# Patient Record
Sex: MALE | Race: WHITE | NOT HISPANIC OR LATINO | Employment: FULL TIME | URBAN - METROPOLITAN AREA
[De-identification: names, ages, dates, MRNs, and addresses within clinical notes are randomized per-mention and may not be internally consistent; named-entity substitution may affect disease eponyms.]

---

## 2019-08-24 ENCOUNTER — OFFICE VISIT (OUTPATIENT)
Dept: URGENT CARE | Facility: CLINIC | Age: 54
End: 2019-08-24
Payer: COMMERCIAL

## 2019-08-24 VITALS
RESPIRATION RATE: 16 BRPM | HEART RATE: 87 BPM | SYSTOLIC BLOOD PRESSURE: 133 MMHG | TEMPERATURE: 99.2 F | BODY MASS INDEX: 19.29 KG/M2 | WEIGHT: 120 LBS | OXYGEN SATURATION: 99 % | DIASTOLIC BLOOD PRESSURE: 78 MMHG | HEIGHT: 66 IN
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
## 2019-08-24 DIAGNOSIS — S61.451A CAT BITE OF RIGHT HAND, INITIAL ENCOUNTER: Primary | ICD-10-CM

## 2019-08-24 PROCEDURE — 99203 OFFICE O/P NEW LOW 30 MIN: CPT | Performed by: PHYSICIAN ASSISTANT

## 2019-08-24 PROCEDURE — 90715 TDAP VACCINE 7 YRS/> IM: CPT

## 2019-08-24 PROCEDURE — 90471 IMMUNIZATION ADMIN: CPT | Performed by: PHYSICIAN ASSISTANT

## 2019-08-24 RX ORDER — AMOXICILLIN AND CLAVULANATE POTASSIUM 875; 125 MG/1; MG/1
1 TABLET, FILM COATED ORAL EVERY 12 HOURS SCHEDULED
Qty: 20 TABLET | Refills: 0 | Status: SHIPPED | OUTPATIENT
Start: 2019-08-24 | End: 2019-09-03

## 2019-08-24 NOTE — PROGRESS NOTES
s  [facility]        NAME: Venus Juarez is a 62 y o  male  : 1965    MRN: [id number]  DATE: 2019  TIME: 4:35 PM    Assessment and Plan   Cat bite of right hand, initial encounter Carlota Blue  1  Cat bite of right hand, initial encounter  amoxicillin-clavulanate (AUGMENTIN) 875-125 mg per tablet    TDAP Vaccine greater than or equal to 6yo         Patient Instructions     Start Augmentin as prescribed  Follow up with PCP in 3-5 days  Proceed to  ER if symptoms worsen  Chief Complaint     Chief Complaint   Patient presents with    Animal Bite     bilateral hands         History of Present Illness       Patient was trying to separate his cat from fighting with another cat when he was bitten by his own cat  This incident happened earlier today as times going on the hand is becoming more increasingly swollen and painful  Last tetanus is unknown  Review of Systems   Review of Systems   Constitutional: Negative for chills and fever  Eyes: Negative for redness  Gastrointestinal: Negative for nausea and vomiting  Musculoskeletal: Negative for arthralgias  Skin: Positive for wound  Neurological: Negative for weakness and numbness  Hematological: Negative for adenopathy  Current Medications       Current Outpatient Medications:     amoxicillin-clavulanate (AUGMENTIN) 875-125 mg per tablet, Take 1 tablet by mouth every 12 (twelve) hours for 10 days, Disp: 20 tablet, Rfl: 0    Current Allergies     Allergies as of 2019    (No Known Allergies)            The following portions of the patient's history were reviewed and updated as appropriate: allergies, current medications, past family history, past medical history, past social history, past surgical history and problem list      History reviewed  No pertinent past medical history  History reviewed  No pertinent surgical history  History reviewed  No pertinent family history        Medications have been verified  Objective   /78   Pulse 87   Temp 99 2 °F (37 3 °C)   Resp 16   Ht 5' 6" (1 676 m)   Wt 54 4 kg (120 lb)   SpO2 99%   BMI 19 37 kg/m²        Physical Exam     Physical Exam   Constitutional: He is oriented to person, place, and time  He appears well-developed and well-nourished  HENT:   Head: Normocephalic and atraumatic  Neck: Normal range of motion  Cardiovascular: Normal rate and regular rhythm  Pulmonary/Chest: Effort normal    Musculoskeletal:   Dorsum of right hand with several punctures and a linear abrasion  There is mild surrounding erythema no current drainage  No lymphatic streaking  Capillary refill less than 2 seconds for   Neurological: He is alert and oriented to person, place, and time  Skin: Skin is warm and dry  Psychiatric: He has a normal mood and affect  His behavior is normal    Nursing note and vitals reviewed

## 2019-08-24 NOTE — PATIENT INSTRUCTIONS
Animal Bite   AMBULATORY CARE:   Animal bite  injuries range from shallow cuts to deep, life-threatening wounds  Animal bites occur more often on the hands, arms, legs, and face  Bites from dogs and cats are the most common injuries  Common symptoms include the following:   · Cut or punctured skin     · Skin torn from your body    · Swollen or bruised skin, even if the skin is not broken  Seek care immediately if:   · You have a fever  · Your wound is red, swollen, and draining pus  · You see red streaks on the skin around the wound  · You can no longer move the bitten area  · Your heartbeat and breathing are much faster than usual     · You feel dizzy and confused  Contact your healthcare provider if:   · Your pain does not get better, even after you take pain medicine  · You have nightmares or flashbacks about the animal bite  · You have questions or concerns about your condition or care  Treatment for an animal bite  may include any of the following:  · Irrigation and debridement  may be needed to clean out your wound  Dead, damaged, or infected tissue may be cut away to help your wound heal     · Medicines:      ¨ Antibiotics  prevent or treat a bacterial infection  ¨ Prescription pain medicine  may be given  Ask how to take this medicine safely  ¨ A tetanus vaccine  may be needed to prevent tetanus  Tetanus is a life-threatening bacterial infection that affects the nerves and muscles  The bacteria can be spread through animal bites  ¨ A rabies vaccine  may be needed to prevent rabies  Rabies is a life-threatening viral infection  The virus can be spread through animal bites  · Stitches  may be needed if your wound is large and not infected  · Surgery  may be needed to repair deep injuries or severe wounds  Manage your symptoms:   · Apply antibiotic ointment as directed  This helps prevent infection in minor skin wounds   Antibiotic ointment is available without a prescription  · Keep the wound clean and covered  Wash the wound every day with soap and water or germ-killing cleanser  Ask what kinds of bandages to use  · Apply ice to your wound  Ice helps prevent tissue damage and decreases swelling and pain  Use an ice pack, or put crushed ice in a plastic bag  Cover it with a towel  Apply ice on your wound for 15 to 20 minutes every hour or as directed  · Elevate your wound  Raise your wound above the level of your heart as often as you can  This will help decrease swelling and pain  Prop your wound on pillows or blankets to keep it elevated comfortably  Prevent another animal bite:   · Learn to recognize the signs of a scared pet  Avoid quick, sudden movements  · Do not step between animals that are fighting  · Do not leave a pet alone with a young child  · Do not disturb an animal while it eats, sleeps, or cares for its young  · Do not approach an animal you do not know, especially one that is tied up or caged  · Stay away from animals that seem sick or act strangely  · Do not feed or capture wild animals  Follow up with your healthcare provider as directed:  Write down your questions so you remember to ask them during your visits  © 2017 [employer] Information is for End User's use only and may not be sold, redistributed or otherwise used for commercial purposes  All illustrations and images included in CareNotes® are the copyrighted property of [employer]  or [employer]  The above information is an  only  It is not intended as medical advice for individual conditions or treatments  Talk to your doctor, nurse or pharmacist before following any medical regimen to see if it is safe and effective for you

## 2021-02-03 ENCOUNTER — HOSPITAL ENCOUNTER (EMERGENCY)
Facility: HOSPITAL | Age: 56
Discharge: HOME | End: 2021-02-04
Attending: EMERGENCY MEDICINE
Payer: COMMERCIAL

## 2021-02-03 ENCOUNTER — APPOINTMENT (EMERGENCY)
Dept: RADIOLOGY | Facility: HOSPITAL | Age: 56
End: 2021-02-03
Payer: COMMERCIAL

## 2021-02-03 DIAGNOSIS — S82.209A TIBIA/FIBULA FRACTURE: Primary | ICD-10-CM

## 2021-02-03 DIAGNOSIS — S82.409A TIBIA/FIBULA FRACTURE: Primary | ICD-10-CM

## 2021-02-03 PROCEDURE — 29505 APPLICATION LONG LEG SPLINT: CPT | Performed by: EMERGENCY MEDICINE

## 2021-02-03 PROCEDURE — 96374 THER/PROPH/DIAG INJ IV PUSH: CPT

## 2021-02-03 PROCEDURE — 99284 EMERGENCY DEPT VISIT MOD MDM: CPT

## 2021-02-03 PROCEDURE — 99285 EMERGENCY DEPT VISIT HI MDM: CPT | Performed by: EMERGENCY MEDICINE

## 2021-02-03 PROCEDURE — 73600 X-RAY EXAM OF ANKLE: CPT

## 2021-02-03 RX ORDER — FENTANYL CITRATE 50 UG/ML
50 INJECTION, SOLUTION INTRAMUSCULAR; INTRAVENOUS ONCE
Status: COMPLETED | OUTPATIENT
Start: 2021-02-03 | End: 2021-02-03

## 2021-02-03 RX ADMIN — FENTANYL CITRATE 50 MCG: 50 INJECTION INTRAMUSCULAR; INTRAVENOUS at 23:14

## 2021-02-04 VITALS
SYSTOLIC BLOOD PRESSURE: 132 MMHG | RESPIRATION RATE: 16 BRPM | DIASTOLIC BLOOD PRESSURE: 80 MMHG | WEIGHT: 125 LBS | BODY MASS INDEX: 20.18 KG/M2 | OXYGEN SATURATION: 97 % | HEART RATE: 68 BPM | TEMPERATURE: 97.9 F

## 2021-02-04 RX ORDER — HYDROCODONE BITARTRATE AND ACETAMINOPHEN 5; 325 MG/1; MG/1
1 TABLET ORAL EVERY 6 HOURS PRN
Qty: 12 TABLET | Refills: 0 | Status: SHIPPED | OUTPATIENT
Start: 2021-02-04 | End: 2021-02-11 | Stop reason: HOSPADM

## 2021-02-04 NOTE — ED PROVIDER NOTES
History  Chief Complaint   Patient presents with    Fall     on ice  R ankl;e pain with deformity       58yo M    Chief complaint:   Right lower leg pain and swelling and deformity after fall from standing pta    Pt slipped on ice , twisting his leg as he went down  There was no secondary fall, no head injury  He has no other complaints or injuries    No cp or sob  No headache or neck pain    No abdominal pain or back pain         History provided by:  Patient  Fall - Major  Location:  Right lower leg   Severity:  Moderate  Onset quality:  Sudden  Timing:  Constant  Chronicity:  New  Associated symptoms: no abdominal pain, no chest pain, no congestion, no cough, no fatigue, no fever, no headaches, no loss of consciousness, no myalgias, no nausea, no rash, no rhinorrhea, no shortness of breath, no vomiting and no wheezing        None       History reviewed  No pertinent past medical history  History reviewed  No pertinent surgical history  History reviewed  No pertinent family history  I have reviewed and agree with the history as documented  E-Cigarette/Vaping    E-Cigarette Use Never User      E-Cigarette/Vaping Substances     Social History     Tobacco Use    Smoking status: Current Every Day Smoker     Packs/day: 0 25    Smokeless tobacco: Never Used   Substance Use Topics    Alcohol use: Yes     Frequency: 2-4 times a month    Drug use: Never       Review of Systems   Constitutional: Negative for chills, diaphoresis, fatigue and fever  HENT: Negative for congestion and rhinorrhea  Respiratory: Negative for cough, shortness of breath, wheezing and stridor  Cardiovascular: Negative for chest pain, palpitations and leg swelling  Gastrointestinal: Negative for abdominal pain, blood in stool, nausea and vomiting  Genitourinary: Negative for difficulty urinating, dysuria, flank pain and frequency     Musculoskeletal: Negative for arthralgias, back pain, gait problem, joint swelling, myalgias, neck pain and neck stiffness  Acute Right ankle pain s/p inversion injury pta   Skin: Negative for rash and wound  Neurological: Negative for dizziness, loss of consciousness, light-headedness and headaches  All other systems reviewed and are negative  Physical Exam  Physical Exam  Constitutional:       General: He is not in acute distress  Appearance: He is well-developed  He is not ill-appearing, toxic-appearing or diaphoretic  HENT:      Head: Normocephalic and atraumatic  Nose: Nose normal       Mouth/Throat:      Pharynx: No oropharyngeal exudate  Eyes:      General: No scleral icterus  Right eye: No discharge  Left eye: No discharge  Conjunctiva/sclera: Conjunctivae normal       Pupils: Pupils are equal, round, and reactive to light  Neck:      Musculoskeletal: Normal range of motion and neck supple  No neck rigidity or muscular tenderness  Vascular: No JVD  Trachea: No tracheal deviation  Cardiovascular:      Rate and Rhythm: Normal rate and regular rhythm  Heart sounds: Normal heart sounds  No murmur  No friction rub  No gallop  Pulmonary:      Effort: Pulmonary effort is normal  No respiratory distress  Breath sounds: Normal breath sounds  No stridor  No wheezing, rhonchi or rales  Chest:      Chest wall: No tenderness  Abdominal:      General: Bowel sounds are normal  There is no distension  Palpations: Abdomen is soft  There is no mass  Tenderness: There is no abdominal tenderness  There is no right CVA tenderness, left CVA tenderness, guarding or rebound  Hernia: No hernia is present  Musculoskeletal: Normal range of motion  General: Swelling (right lower leg ), tenderness (right lower leg ), deformity (right lower leg ) and signs of injury (right lower leg ) present  Right lower leg: No edema  Left lower leg: No edema  Lymphadenopathy:      Cervical: No cervical adenopathy  Skin:     General: Skin is warm  Capillary Refill: Capillary refill takes less than 2 seconds  Coloration: Skin is not jaundiced or pale  Findings: No bruising, erythema, lesion or rash  Neurological:      General: No focal deficit present  Mental Status: He is alert and oriented to person, place, and time  Mental status is at baseline  Cranial Nerves: No cranial nerve deficit  Sensory: No sensory deficit  Motor: No weakness or abnormal muscle tone  Coordination: Coordination normal    Psychiatric:         Behavior: Behavior normal          Thought Content: Thought content normal          Judgment: Judgment normal       Comments: Agitated affect due to pain          Vital Signs  ED Triage Vitals [02/03/21 2307]   Temperature Pulse Respirations Blood Pressure SpO2   97 9 °F (36 6 °C) 80 18 138/80 97 %      Temp src Heart Rate Source Patient Position - Orthostatic VS BP Location FiO2 (%)   -- -- -- -- --      Pain Score       7           Vitals:    02/03/21 2307 02/03/21 2315 02/04/21 0029   BP: 138/80 138/80 132/80   Pulse: 80 70 68         Visual Acuity      ED Medications  Medications   fentanyl citrate (PF) 100 MCG/2ML 50 mcg (50 mcg Intravenous Given 2/3/21 2314)       Diagnostic Studies  Results Reviewed     None                 XR ankle 2 vw right   ED Interpretation by Dominguez Villar MD (02/03 2343)   Tib Fib Fx       by Austin Jara (02/03 2318)                 Procedures  Procedures         ED Course  ED Course as of Feb 04 0249   Wed Feb 03, 2021   2340 Right leg splinted  Pt tolerated well       Thu Feb 04, 2021   0037 Patient is ambulatory, steady on crutches, independently  He is very grateful for his ER care  He is being assisted home by a friend                                SBIRT 20yo+      Most Recent Value   SBIRT (22 yo +)   In order to provide better care to our patients, we are screening all of our patients for alcohol and drug use   Would it be okay to ask you these screening questions? Yes Filed at: 02/03/2021 2327   Initial Alcohol Screen: US AUDIT-C    1  How often do you have a drink containing alcohol? 3 Filed at: 02/03/2021 2327   2  How many drinks containing alcohol do you have on a typical day you are drinking? 1 Filed at: 02/03/2021 2327   3a  Male UNDER 65: How often do you have five or more drinks on one occasion? 0 Filed at: 02/03/2021 2327   3b  FEMALE Any Age, or MALE 65+: How often do you have 4 or more drinks on one occassion? 0 Filed at: 02/03/2021 2327   Audit-C Score  4 Filed at: 02/03/2021 2327   JORDIN: How many times in the past year have you    Used an illegal drug or used a prescription medication for non-medical reasons? Never Filed at: 02/03/2021 2327                    MDM    Disposition  Final diagnoses:   Tibia/fibula fracture     Time reflects when diagnosis was documented in both MDM as applicable and the Disposition within this note     Time User Action Codes Description Comment    2/3/2021 11:37 PM Farhat Bergeron Add [H76 709M,  S82 409A] Tibia/fibula fracture       ED Disposition     ED Disposition Condition Date/Time Comment    Discharge Stable Wed Feb 3, 2021 11:36 PM Uma Shepard discharge to home/self care  Follow-up Information     Follow up With Specialties Details Why Contact Info    Gómez Paris, DO Orthopedic Surgery Call today  [address] [address]  [phone]            There are no discharge medications for this patient          PDMP Review     None          ED Provider  Electronically Signed by           Burdette Hashimoto, MD  02/04/21 6735

## 2021-02-04 NOTE — DISCHARGE INSTRUCTIONS
RETURN IF WORSE IN ANY WAY, OR NEW AND CONCERNING SYMPTOMS SIGNS OR SYMPTOMS:  INCREASED PAIN, INCREASED SWELLING AT THE SITE OF INJURY    APPLY ICE AS NEEDED  YOU CAN GIVE TYLENOL AND MOTRIN, AS NEEDED, AND Kvng Bazan, Orthopaedic Specialist DOCTOR IN THE MORNING TO SET UP FOLLOW UP

## 2021-02-04 NOTE — ED PROCEDURE NOTE
PROCEDURE  Splint application    Date/Time: 2/3/2021 11:40 PM  Performed by: Marie Ross MD  Authorized by: Marie Ross MD   Universal Protocol:  Risks and benefits: risks, benefits and alternatives were discussed  Consent given by: patient  Time out: Immediately prior to procedure a "time out" was called to verify the correct patient, procedure, equipment, support staff and site/side marked as required  Patient understanding: patient states understanding of the procedure being performed  Radiology Images displayed and confirmed   If images not available, report reviewed: imaging studies available  Patient identity confirmed: verbally with patient      Pre-procedure details:     Sensation:  Normal  Procedure details:     Laterality:  Right    Location:  Leg    Leg:  R lower leg    Splint type:  Long leg    Supplies:  Cotton padding, fiberglass and Ortho-Glass         Marie Ross MD  02/03/21 6191

## 2021-02-08 VITALS
DIASTOLIC BLOOD PRESSURE: 79 MMHG | HEIGHT: 66 IN | HEART RATE: 109 BPM | BODY MASS INDEX: 20.09 KG/M2 | SYSTOLIC BLOOD PRESSURE: 146 MMHG | WEIGHT: 125 LBS

## 2021-02-08 DIAGNOSIS — Z11.59 SPECIAL SCREENING EXAMINATION FOR UNSPECIFIED VIRAL DISEASE: Primary | ICD-10-CM

## 2021-02-08 DIAGNOSIS — S82.409A TIBIA/FIBULA FRACTURE: ICD-10-CM

## 2021-02-08 DIAGNOSIS — S82.209A TIBIA/FIBULA FRACTURE: ICD-10-CM

## 2021-02-08 PROCEDURE — 99203 OFFICE O/P NEW LOW 30 MIN: CPT | Performed by: ORTHOPAEDIC SURGERY

## 2021-02-08 RX ORDER — CHLORHEXIDINE GLUCONATE 4 G/100ML
SOLUTION TOPICAL DAILY PRN
Status: CANCELLED | OUTPATIENT
Start: 2021-02-10

## 2021-02-08 RX ORDER — CHLORHEXIDINE GLUCONATE 0.12 MG/ML
15 RINSE ORAL ONCE
Status: CANCELLED | OUTPATIENT
Start: 2021-02-10 | End: 2021-02-08

## 2021-02-08 RX ORDER — CEFAZOLIN SODIUM 2 G/50ML
2000 SOLUTION INTRAVENOUS ONCE
Status: CANCELLED | OUTPATIENT
Start: 2021-02-10 | End: 2021-02-08

## 2021-02-08 NOTE — PROGRESS NOTES
Assessment/Plan:    No problem-specific Assessment & Plan notes found for this encounter  Diagnoses and all orders for this visit:    Tibia/fibula fracture  -     Ambulatory referral to Orthopedic Surgery  -     XR tibia fibula 2 vw right; Future            The patient has opted for an open reduction and internal fixation of his right lower leg injury  Will probably need intramedullary rodding of the tibia and open reduction and internal fixation of the fibula  All risks, complications, benefits were discussed with the patient in great detail including bleeding, infection, blood clots, pain, stiffness, neurovascular damage, fractures, dislocations, the possibility of loss of life or limb from surgery, heart attack, stroke, etcetera     his surgery is tentatively scheduled for February 10, 2021    Subjective:      Patient ID: Jeremy Edouard is a 60 y o  male  HPI      the patient injured his right lower leg while walking out of work 5 days ago  He slipped and fell in the parking lot at work  He denies hitting his head or any loss of conscious  He had immediate pain and deformity along his right ankle  He did go to the emergency room where x-rays were taken he was given a splint  He still has pain  He denies any numbness and tingling     The following portions of the patient's history were reviewed and updated as appropriate: allergies, current medications, past family history, past medical history, past social history, past surgical history and problem list     Review of Systems   Constitutional: Negative for chills, fever and unexpected weight change  HENT: Negative for hearing loss, nosebleeds and sore throat  Eyes: Negative for pain, redness and visual disturbance  Respiratory: Negative for cough, shortness of breath and wheezing  Cardiovascular: Negative for chest pain, palpitations and leg swelling  Gastrointestinal: Negative for abdominal pain, nausea and vomiting     Endocrine: Negative for polydipsia and polyuria  Genitourinary: Negative for dysuria and hematuria  Musculoskeletal: Positive for arthralgias, gait problem and joint swelling  Negative for back pain, myalgias, neck pain and neck stiffness  As noted in HPI   Skin: Negative for rash and wound  Neurological: Negative for dizziness, numbness and headaches  Psychiatric/Behavioral: Negative for decreased concentration and suicidal ideas  The patient is not nervous/anxious  Objective:      /79 (BP Location: Left arm, Patient Position: Sitting, Cuff Size: Standard)   Pulse (!) 109   Ht 5' 6" (1 676 m)   Wt 56 7 kg (125 lb)   BMI 20 18 kg/m²          Physical Exam       right lower extremity is neurovascular intact  Toes are pink and mobile  Compartments are soft  Range of motion of his knee is from 0-125 degrees  There is no ligament dysfunction  There is tenderness along the right distal tibia/ fibula  There is fracture diastasis present  There is decreased range of motion secondary to pain  X-ray show a displaced right tibial shaft fracture with a distal fibular fracture which is displaced as well

## 2021-02-08 NOTE — H&P (VIEW-ONLY)
Assessment/Plan:    No problem-specific Assessment & Plan notes found for this encounter  Diagnoses and all orders for this visit:    Tibia/fibula fracture  -     Ambulatory referral to Orthopedic Surgery  -     XR tibia fibula 2 vw right; Future            The patient has opted for an open reduction and internal fixation of his right lower leg injury  Will probably need intramedullary rodding of the tibia and open reduction and internal fixation of the fibula  All risks, complications, benefits were discussed with the patient in great detail including bleeding, infection, blood clots, pain, stiffness, neurovascular damage, fractures, dislocations, the possibility of loss of life or limb from surgery, heart attack, stroke, etcetera     his surgery is tentatively scheduled for February 10, 2021    Subjective:      Patient ID: Petros Appiah is a 60 y o  male  HPI      the patient injured his right lower leg while walking out of work 5 days ago  He slipped and fell in the parking lot at work  He denies hitting his head or any loss of conscious  He had immediate pain and deformity along his right ankle  He did go to the emergency room where x-rays were taken he was given a splint  He still has pain  He denies any numbness and tingling     The following portions of the patient's history were reviewed and updated as appropriate: allergies, current medications, past family history, past medical history, past social history, past surgical history and problem list     Review of Systems   Constitutional: Negative for chills, fever and unexpected weight change  HENT: Negative for hearing loss, nosebleeds and sore throat  Eyes: Negative for pain, redness and visual disturbance  Respiratory: Negative for cough, shortness of breath and wheezing  Cardiovascular: Negative for chest pain, palpitations and leg swelling  Gastrointestinal: Negative for abdominal pain, nausea and vomiting     Endocrine: Negative for polydipsia and polyuria  Genitourinary: Negative for dysuria and hematuria  Musculoskeletal: Positive for arthralgias, gait problem and joint swelling  Negative for back pain, myalgias, neck pain and neck stiffness  As noted in HPI   Skin: Negative for rash and wound  Neurological: Negative for dizziness, numbness and headaches  Psychiatric/Behavioral: Negative for decreased concentration and suicidal ideas  The patient is not nervous/anxious  Objective:      /79 (BP Location: Left arm, Patient Position: Sitting, Cuff Size: Standard)   Pulse (!) 109   Ht 5' 6" (1 676 m)   Wt 56 7 kg (125 lb)   BMI 20 18 kg/m²          Physical Exam       right lower extremity is neurovascular intact  Toes are pink and mobile  Compartments are soft  Range of motion of his knee is from 0-125 degrees  There is no ligament dysfunction  There is tenderness along the right distal tibia/ fibula  There is fracture diastasis present  There is decreased range of motion secondary to pain  X-ray show a displaced right tibial shaft fracture with a distal fibular fracture which is displaced as well

## 2021-02-09 ENCOUNTER — ANESTHESIA EVENT (OUTPATIENT)
Dept: PERIOP | Facility: HOSPITAL | Age: 56
End: 2021-02-09
Payer: COMMERCIAL

## 2021-02-09 NOTE — PRE-PROCEDURE INSTRUCTIONS
Pre-Surgery Instructions:   Medication Instructions    HYDROcodone-acetaminophen (NORCO) 5-325 mg per tablet Instructed patient per Anesthesia Guidelines  Education Index    Med Instructions Troubleshoot   Acetaminophen Med Class    Continue to take this medication on your normal schedule  If this is an oral medication and you take it in the morning, then you may take this medicine with a sip of water  Opioid Med Class    Continue to take this medication on your normal schedule  If this is an oral medication and you take it in the morning, then you may take this medicine with a sip of water

## 2021-02-10 ENCOUNTER — HOSPITAL ENCOUNTER (OUTPATIENT)
Facility: HOSPITAL | Age: 56
Setting detail: OUTPATIENT SURGERY
Discharge: HOME | End: 2021-02-11
Attending: ORTHOPAEDIC SURGERY | Admitting: ORTHOPAEDIC SURGERY
Payer: COMMERCIAL

## 2021-02-10 ENCOUNTER — APPOINTMENT (OUTPATIENT)
Dept: RADIOLOGY | Facility: HOSPITAL | Age: 56
End: 2021-02-10
Payer: COMMERCIAL

## 2021-02-10 ENCOUNTER — ANESTHESIA (OUTPATIENT)
Dept: PERIOP | Facility: HOSPITAL | Age: 56
End: 2021-02-10
Payer: COMMERCIAL

## 2021-02-10 VITALS — HEART RATE: 96 BPM

## 2021-02-10 DIAGNOSIS — S82.401S FRACTURE TIBIA/FIBULA, RIGHT, SEQUELA: Primary | ICD-10-CM

## 2021-02-10 DIAGNOSIS — S82.201S FRACTURE TIBIA/FIBULA, RIGHT, SEQUELA: Primary | ICD-10-CM

## 2021-02-10 PROBLEM — S82.202S FRACTURE TIBIA/FIBULA, LEFT, SEQUELA: Status: ACTIVE | Noted: 2021-02-10

## 2021-02-10 PROBLEM — S82.402S FRACTURE TIBIA/FIBULA, LEFT, SEQUELA: Status: ACTIVE | Noted: 2021-02-10

## 2021-02-10 LAB
FLUAV RNA RESP QL NAA+PROBE: NEGATIVE
FLUBV RNA RESP QL NAA+PROBE: NEGATIVE
RSV RNA RESP QL NAA+PROBE: NEGATIVE
SARS-COV-2 RNA RESP QL NAA+PROBE: NEGATIVE

## 2021-02-10 PROCEDURE — C1713 ANCHOR/SCREW BN/BN,TIS/BN: HCPCS | Performed by: ORTHOPAEDIC SURGERY

## 2021-02-10 PROCEDURE — 0241U HB NFCT DS VIR RESP RNA 4 TRGT: CPT | Performed by: STUDENT IN AN ORGANIZED HEALTH CARE EDUCATION/TRAINING PROGRAM

## 2021-02-10 PROCEDURE — 73590 X-RAY EXAM OF LOWER LEG: CPT | Performed by: ORTHOPAEDIC SURGERY

## 2021-02-10 PROCEDURE — 27759 TREATMENT OF TIBIA FRACTURE: CPT | Performed by: ORTHOPAEDIC SURGERY

## 2021-02-10 PROCEDURE — 27759 TREATMENT OF TIBIA FRACTURE: CPT | Performed by: PHYSICIAN ASSISTANT

## 2021-02-10 PROCEDURE — 73590 X-RAY EXAM OF LOWER LEG: CPT

## 2021-02-10 DEVICE — TI END CAP FOR TIBIAL NAILS-EX T40 STRDRV GRAY/5MM EXT-STER
Type: IMPLANTABLE DEVICE | Site: TIBIA | Status: FUNCTIONAL
Brand: EXPERT NAIL

## 2021-02-10 DEVICE — 9MM TI CANN TIBIAL NAIL-EX W/PROX BEND 330MM-STERILE
Type: IMPLANTABLE DEVICE | Site: TIBIA | Status: FUNCTIONAL
Brand: EXPERT NAIL

## 2021-02-10 DEVICE — 4.0MM TI LOCKING SCREW W/T25 STARDRIVE 32MM F/IM NAILS-STER: Type: IMPLANTABLE DEVICE | Site: TIBIA | Status: FUNCTIONAL

## 2021-02-10 DEVICE — 4.0MM TI LOCKING SCREW W/T25 STARDRIVE 40MM F/IM NAILS-STER: Type: IMPLANTABLE DEVICE | Site: TIBIA | Status: FUNCTIONAL

## 2021-02-10 RX ORDER — BUPIVACAINE HYDROCHLORIDE AND EPINEPHRINE 5; 5 MG/ML; UG/ML
INJECTION, SOLUTION EPIDURAL; INTRACAUDAL; PERINEURAL AS NEEDED
Status: DISCONTINUED | OUTPATIENT
Start: 2021-02-10 | End: 2021-02-10 | Stop reason: HOSPADM

## 2021-02-10 RX ORDER — CEFAZOLIN SODIUM 2 G/50ML
2000 SOLUTION INTRAVENOUS ONCE
Status: DISCONTINUED | OUTPATIENT
Start: 2021-02-10 | End: 2021-02-10 | Stop reason: HOSPADM

## 2021-02-10 RX ORDER — EPHEDRINE SULFATE 50 MG/ML
INJECTION INTRAVENOUS AS NEEDED
Status: DISCONTINUED | OUTPATIENT
Start: 2021-02-10 | End: 2021-02-10

## 2021-02-10 RX ORDER — MAGNESIUM HYDROXIDE/ALUMINUM HYDROXICE/SIMETHICONE 120; 1200; 1200 MG/30ML; MG/30ML; MG/30ML
30 SUSPENSION ORAL EVERY 6 HOURS PRN
Status: DISCONTINUED | OUTPATIENT
Start: 2021-02-10 | End: 2021-02-11 | Stop reason: HOSPADM

## 2021-02-10 RX ORDER — BUPIVACAINE HYDROCHLORIDE 5 MG/ML
INJECTION, SOLUTION PERINEURAL AS NEEDED
Status: DISCONTINUED | OUTPATIENT
Start: 2021-02-10 | End: 2021-02-10 | Stop reason: HOSPADM

## 2021-02-10 RX ORDER — ONDANSETRON 2 MG/ML
4 INJECTION INTRAMUSCULAR; INTRAVENOUS ONCE AS NEEDED
Status: DISCONTINUED | OUTPATIENT
Start: 2021-02-10 | End: 2021-02-10 | Stop reason: HOSPADM

## 2021-02-10 RX ORDER — SODIUM CHLORIDE, SODIUM LACTATE, POTASSIUM CHLORIDE, CALCIUM CHLORIDE 600; 310; 30; 20 MG/100ML; MG/100ML; MG/100ML; MG/100ML
125 INJECTION, SOLUTION INTRAVENOUS CONTINUOUS
Status: DISCONTINUED | OUTPATIENT
Start: 2021-02-10 | End: 2021-02-10

## 2021-02-10 RX ORDER — OXYCODONE HYDROCHLORIDE AND ACETAMINOPHEN 5; 325 MG/1; MG/1
1 TABLET ORAL EVERY 4 HOURS PRN
Status: DISCONTINUED | OUTPATIENT
Start: 2021-02-10 | End: 2021-02-11 | Stop reason: HOSPADM

## 2021-02-10 RX ORDER — MORPHINE SULFATE 4 MG/ML
3 INJECTION, SOLUTION INTRAMUSCULAR; INTRAVENOUS EVERY 4 HOURS PRN
Status: DISCONTINUED | OUTPATIENT
Start: 2021-02-10 | End: 2021-02-11 | Stop reason: HOSPADM

## 2021-02-10 RX ORDER — POVIDONE-IODINE 10 MG/G
OINTMENT TOPICAL AS NEEDED
Status: DISCONTINUED | OUTPATIENT
Start: 2021-02-10 | End: 2021-02-10 | Stop reason: HOSPADM

## 2021-02-10 RX ORDER — CEFAZOLIN SODIUM 1 G/50ML
1000 SOLUTION INTRAVENOUS EVERY 8 HOURS
Status: DISCONTINUED | OUTPATIENT
Start: 2021-02-11 | End: 2021-02-11 | Stop reason: HOSPADM

## 2021-02-10 RX ORDER — FENTANYL CITRATE 50 UG/ML
INJECTION, SOLUTION INTRAMUSCULAR; INTRAVENOUS AS NEEDED
Status: DISCONTINUED | OUTPATIENT
Start: 2021-02-10 | End: 2021-02-10

## 2021-02-10 RX ORDER — ONDANSETRON 2 MG/ML
INJECTION INTRAMUSCULAR; INTRAVENOUS AS NEEDED
Status: DISCONTINUED | OUTPATIENT
Start: 2021-02-10 | End: 2021-02-10

## 2021-02-10 RX ORDER — CEFAZOLIN SODIUM 2 G/50ML
SOLUTION INTRAVENOUS AS NEEDED
Status: DISCONTINUED | OUTPATIENT
Start: 2021-02-10 | End: 2021-02-10

## 2021-02-10 RX ORDER — HYDROMORPHONE HCL/PF 1 MG/ML
SYRINGE (ML) INJECTION AS NEEDED
Status: DISCONTINUED | OUTPATIENT
Start: 2021-02-10 | End: 2021-02-10

## 2021-02-10 RX ORDER — GLYCOPYRROLATE 0.2 MG/ML
INJECTION INTRAMUSCULAR; INTRAVENOUS AS NEEDED
Status: DISCONTINUED | OUTPATIENT
Start: 2021-02-10 | End: 2021-02-10

## 2021-02-10 RX ORDER — DOCUSATE SODIUM 100 MG/1
100 CAPSULE, LIQUID FILLED ORAL 2 TIMES DAILY
Status: DISCONTINUED | OUTPATIENT
Start: 2021-02-10 | End: 2021-02-11 | Stop reason: HOSPADM

## 2021-02-10 RX ORDER — MIDAZOLAM HYDROCHLORIDE 2 MG/2ML
INJECTION, SOLUTION INTRAMUSCULAR; INTRAVENOUS AS NEEDED
Status: DISCONTINUED | OUTPATIENT
Start: 2021-02-10 | End: 2021-02-10

## 2021-02-10 RX ORDER — CHLORHEXIDINE GLUCONATE 4 G/100ML
SOLUTION TOPICAL DAILY PRN
Status: DISCONTINUED | OUTPATIENT
Start: 2021-02-10 | End: 2021-02-10 | Stop reason: HOSPADM

## 2021-02-10 RX ORDER — ROCURONIUM BROMIDE 10 MG/ML
INJECTION, SOLUTION INTRAVENOUS AS NEEDED
Status: DISCONTINUED | OUTPATIENT
Start: 2021-02-10 | End: 2021-02-10

## 2021-02-10 RX ORDER — PROPOFOL 10 MG/ML
INJECTION, EMULSION INTRAVENOUS AS NEEDED
Status: DISCONTINUED | OUTPATIENT
Start: 2021-02-10 | End: 2021-02-10

## 2021-02-10 RX ORDER — CHLORHEXIDINE GLUCONATE 0.12 MG/ML
15 RINSE ORAL ONCE
Status: COMPLETED | OUTPATIENT
Start: 2021-02-10 | End: 2021-02-10

## 2021-02-10 RX ORDER — FONDAPARINUX SODIUM 2.5 MG/.5ML
2.5 INJECTION SUBCUTANEOUS DAILY
Status: DISCONTINUED | OUTPATIENT
Start: 2021-02-11 | End: 2021-02-11 | Stop reason: HOSPADM

## 2021-02-10 RX ORDER — NEOSTIGMINE METHYLSULFATE 1 MG/ML
INJECTION INTRAVENOUS AS NEEDED
Status: DISCONTINUED | OUTPATIENT
Start: 2021-02-10 | End: 2021-02-10

## 2021-02-10 RX ORDER — FENTANYL CITRATE/PF 50 MCG/ML
25 SYRINGE (ML) INJECTION
Status: DISCONTINUED | OUTPATIENT
Start: 2021-02-10 | End: 2021-02-10 | Stop reason: HOSPADM

## 2021-02-10 RX ORDER — DEXAMETHASONE SODIUM PHOSPHATE 10 MG/ML
INJECTION, SOLUTION INTRAMUSCULAR; INTRAVENOUS AS NEEDED
Status: DISCONTINUED | OUTPATIENT
Start: 2021-02-10 | End: 2021-02-10

## 2021-02-10 RX ORDER — ACETAMINOPHEN 325 MG/1
650 TABLET ORAL EVERY 4 HOURS PRN
Status: DISCONTINUED | OUTPATIENT
Start: 2021-02-10 | End: 2021-02-11 | Stop reason: HOSPADM

## 2021-02-10 RX ORDER — KETAMINE HCL IN NACL, ISO-OSM 100MG/10ML
SYRINGE (ML) INJECTION AS NEEDED
Status: DISCONTINUED | OUTPATIENT
Start: 2021-02-10 | End: 2021-02-10

## 2021-02-10 RX ORDER — LIDOCAINE HYDROCHLORIDE 10 MG/ML
INJECTION, SOLUTION EPIDURAL; INFILTRATION; INTRACAUDAL; PERINEURAL AS NEEDED
Status: DISCONTINUED | OUTPATIENT
Start: 2021-02-10 | End: 2021-02-10

## 2021-02-10 RX ADMIN — CHLORHEXIDINE GLUCONATE 0.12% ORAL RINSE 15 ML: 1.2 LIQUID ORAL at 13:41

## 2021-02-10 RX ADMIN — CEFAZOLIN SODIUM 2000 MG: 2 SOLUTION INTRAVENOUS at 16:30

## 2021-02-10 RX ADMIN — CEFAZOLIN SODIUM 1000 MG: 2 SOLUTION INTRAVENOUS at 17:36

## 2021-02-10 RX ADMIN — FENTANYL CITRATE 100 MCG: 50 INJECTION INTRAMUSCULAR; INTRAVENOUS at 17:29

## 2021-02-10 RX ADMIN — HYDROMORPHONE HYDROCHLORIDE 0.5 MG: 1 INJECTION, SOLUTION INTRAMUSCULAR; INTRAVENOUS; SUBCUTANEOUS at 18:02

## 2021-02-10 RX ADMIN — SODIUM CHLORIDE, SODIUM LACTATE, POTASSIUM CHLORIDE, AND CALCIUM CHLORIDE: .6; .31; .03; .02 INJECTION, SOLUTION INTRAVENOUS at 15:50

## 2021-02-10 RX ADMIN — HYDROMORPHONE HYDROCHLORIDE 1 MG: 1 INJECTION, SOLUTION INTRAMUSCULAR; INTRAVENOUS; SUBCUTANEOUS at 19:07

## 2021-02-10 RX ADMIN — DEXAMETHASONE SODIUM PHOSPHATE 4 MG: 10 INJECTION, SOLUTION INTRAMUSCULAR; INTRAVENOUS at 16:51

## 2021-02-10 RX ADMIN — GLYCOPYRROLATE 0.4 MG: 0.2 INJECTION, SOLUTION INTRAMUSCULAR; INTRAVENOUS at 18:52

## 2021-02-10 RX ADMIN — NEOSTIGMINE METHYLSULFATE 4 MG: 1 INJECTION, SOLUTION INTRAVENOUS at 18:52

## 2021-02-10 RX ADMIN — ROCURONIUM BROMIDE 50 MG: 10 INJECTION INTRAVENOUS at 16:41

## 2021-02-10 RX ADMIN — ONDANSETRON 4 MG: 2 INJECTION INTRAMUSCULAR; INTRAVENOUS at 16:50

## 2021-02-10 RX ADMIN — LIDOCAINE HYDROCHLORIDE 50 MG: 10 INJECTION, SOLUTION EPIDURAL; INFILTRATION; INTRACAUDAL; PERINEURAL at 16:38

## 2021-02-10 RX ADMIN — EPHEDRINE SULFATE 10 MG: 50 INJECTION, SOLUTION INTRAVENOUS at 17:19

## 2021-02-10 RX ADMIN — PROPOFOL 200 MG: 10 INJECTION, EMULSION INTRAVENOUS at 16:40

## 2021-02-10 RX ADMIN — FENTANYL CITRATE 100 MCG: 50 INJECTION INTRAMUSCULAR; INTRAVENOUS at 16:36

## 2021-02-10 RX ADMIN — HYDROMORPHONE HYDROCHLORIDE 0.5 MG: 1 INJECTION, SOLUTION INTRAMUSCULAR; INTRAVENOUS; SUBCUTANEOUS at 17:57

## 2021-02-10 RX ADMIN — DOCUSATE SODIUM 100 MG: 100 CAPSULE, LIQUID FILLED ORAL at 21:05

## 2021-02-10 RX ADMIN — MIDAZOLAM HYDROCHLORIDE 2 MG: 1 INJECTION, SOLUTION INTRAMUSCULAR; INTRAVENOUS at 16:35

## 2021-02-10 RX ADMIN — Medication 50 MG: at 16:52

## 2021-02-10 NOTE — OP NOTE
OPERATIVE REPORT  PATIENT NAME: Darian Mendoza    :  1965  MRN: [id number]  Pt Location: [address] [place] [address] OR ROOM 02    SURGERY DATE: 2/10/2021    Surgeon(s) and Role:     * Frances Simons, DO - Primary     * Ewelina Stajennifer, CYDNEY - Assisting       Vangie Petit PA-C    Preop Diagnosis:  Tibia/fibula fracture [S82 209A, S82 409A]    Post-Op Diagnosis Codes:     * Tibia/fibula fracture [S82 209A, S82 409A]    Procedure(s) (LRB):  INSERTION NAIL IM TIBIA  WITH ORIF OF LATERAL MALLEOLI (Right) using the Synthes intramedullary nail system with a size 9 mm x 330 mm tibial nail, and a 32 mm, 32 mm, and a 40 mm screws    Specimen(s):  None    Estimated Blood Loss:   150 cc    Drains:  None    Anesthesia Type:   General with local placed at conclusion of procedure    Operative Indications:  Tibia/fibula fracture [S82 209A, S82 409A]      Operative Findings:  TT: 0    Complications:   None    Procedure and Technique:    The patient was properly identified and brought into the operating suite  After successful induction of the anesthetic, a tourniquet was placed on the patient's right proximal thigh  The right lower extremity was then prepped and draped in usual fashion    A physician assistant was in the room to aid in positioning and applying the appropriate amount of retraction and the patient  A qualified resident was not available    A sharp pointed bone clamp was used to reduce the fracture which was confirmed using multiple fluoroscopic views  The surgical landmarks were outlined a skin marker  Using a #15  Scalpel blade, a longitudinal incision was made in the patient's right knee starting at the level of the tibial tubercle and extending proximally to the inferior pole of the patella  Hemostasis was achieved with the use of electrocautery  Through  further blunt dissection, the paratenon was located and divided in line with his fibers  The patella tendon was then split longitudinally along its midline    The retropatellar fat was then excised  Using the Synthes tibial rodding set, a guide pin was placed through the tip of the proximal tibia and extending distally into metaphyseal bone  Once this is confirmed to be in good position, the starting Reamer was used to open up the canal   A long ball-tipped guide pin was then placed and advanced distally to distal scar of the tibia  This was then measured  Using sequential reamers, the canal was then reamed  There was good chatter with a 10 mm Reamer  This was advanced to 10 5 mm  In the meantime, he 9 mm by 330 mm tibial erick was assembled  It was then passed over the guide pin without any incidence  The guide pin was then removed  Fluoroscopy showed that the fracture was now anatomic the reduced  Using the proximal locking apparatus, the proximal hole was drilled measured 32 mm  A 5 mm end cap was placed on the erick  The guide was removed  Distally using a combination of try angulation, a 15  Scalpel blade, and a hemostat to distal holes were drilled and measured  In this circumstance it was a 40 mm and 32 mm respectively  At this point the fracture was inspected again and found to be in anatomic position  The lateral malleolar fracture reduced itself is well  Albeit quite comminuted, the reduction was greatly improved and the mortise stayed intact  All the wounds were irrigated with sterile antibiotic solution  The patella tendon was closed with #1  Vicryl  Paratenon with 0 Vicryl  Deep layer fascia was closed with 0 Vicryl  Superficial layer was closed with 2-0 Vicryl  The skin was approximated with staples proximally and 3-0 nylon distally  All the incisional sites were infiltrated with 0 5% Marcaine  The wounds were then dressed with ointment, Xeroform, 4x4s, ABDs, sterile rubber all an Ace bandage  There was no complications during procedure    He was successfully woken, transferred to his hospital bed and went to the recovery room in stable condition     I was present for the entire procedure, A qualified resident physician was not available and A physician assistant was required during the procedure for retraction tissue handling,dissection and suturing    Patient Disposition:  PACU     SIGNATURE: Chuck Weber DO  DATE: February 10, 2021  TIME: 4:21 PM

## 2021-02-10 NOTE — ANESTHESIA PREPROCEDURE EVALUATION
Procedure:  INSERTION NAIL IM TIBIA  WITH ORIF OF LATERAL MALLEOLI (Right Leg Lower)    Relevant Problems   ANESTHESIA (within normal limits)   Results for Inez Lorenzo (MRN [id number]) as of 2/10/2021 14:45   Ref  Range 2/10/2021 13:22   INFLU A PCR Latest Ref Range: Negative  Negative   INFLU B PCR Latest Ref Range: Negative  Negative   RSV PCR Latest Ref Range: Negative  Negative   SARS-COV-2 Latest Ref Range: Negative  Negative        Physical Exam    Airway    Mallampati score: II  TM Distance: >3 FB  Neck ROM: full     Dental   No notable dental hx     Cardiovascular  Cardiovascular exam normal    Pulmonary  Pulmonary exam normal     Other Findings        Anesthesia Plan  ASA Score- 1     Anesthesia Type- general with ASA Monitors  Additional Monitors:   Airway Plan: LMA  Comment: I, Maggie Kirby MD, discussed risks (reviewed with patient on the anesthesia consent form), benefits and alternatives for General Anesthesia  These risks did include breathing tube remaining in place if not strong enough, PONV, damage to lips and teeth, sore throat, eye injury or blindness, risk of heart attack or stroke that may lead to death          Plan Factors-Exercise tolerance (METS): >4 METS  Chart reviewed  Patient summary reviewed  Induction- intravenous  Postoperative Plan- Plan for postoperative opioid use  Informed Consent- Anesthetic plan and risks discussed with patient  I personally reviewed this patient with the CRNA  Discussed and agreed on the Anesthesia Plan with the CRNA  Carlos Mcleod

## 2021-02-10 NOTE — INTERVAL H&P NOTE
H&P reviewed  After examining the patient I find no changes in the patients condition since the H&P had been written    Lungs CTA  Heart RRR    Vitals:    02/10/21 1331   BP: 124/74   Pulse: 75   Resp: 18   Temp: 98 4 °F (36 9 °C)   SpO2: 98%

## 2021-02-11 VITALS
RESPIRATION RATE: 18 BRPM | SYSTOLIC BLOOD PRESSURE: 160 MMHG | WEIGHT: 125 LBS | HEIGHT: 66 IN | TEMPERATURE: 98.1 F | BODY MASS INDEX: 20.09 KG/M2 | DIASTOLIC BLOOD PRESSURE: 76 MMHG | OXYGEN SATURATION: 97 % | HEART RATE: 58 BPM

## 2021-02-11 PROBLEM — S82.401S FRACTURE TIBIA/FIBULA, RIGHT, SEQUELA: Status: ACTIVE | Noted: 2021-02-10

## 2021-02-11 PROBLEM — S82.201S FRACTURE TIBIA/FIBULA, RIGHT, SEQUELA: Status: ACTIVE | Noted: 2021-02-10

## 2021-02-11 LAB
ANION GAP SERPL CALCULATED.3IONS-SCNC: 11 MMOL/L (ref 4–13)
BUN SERPL-MCNC: 13 MG/DL (ref 7–25)
CALCIUM SERPL-MCNC: 8.7 MG/DL (ref 8.6–10.5)
CHLORIDE SERPL-SCNC: 100 MMOL/L (ref 98–107)
CO2 SERPL-SCNC: 27 MMOL/L (ref 21–31)
CREAT SERPL-MCNC: 0.91 MG/DL (ref 0.7–1.3)
GFR SERPL CREATININE-BSD FRML MDRD: 93 ML/MIN/1.73SQ M
GLUCOSE P FAST SERPL-MCNC: 92 MG/DL (ref 65–99)
GLUCOSE SERPL-MCNC: 92 MG/DL (ref 65–99)
HCT VFR BLD AUTO: 34.7 % (ref 42–47)
HGB BLD-MCNC: 11.5 G/DL (ref 14–18)
POTASSIUM SERPL-SCNC: 4.4 MMOL/L (ref 3.5–5.5)
SODIUM SERPL-SCNC: 138 MMOL/L (ref 134–143)

## 2021-02-11 PROCEDURE — 97163 PT EVAL HIGH COMPLEX 45 MIN: CPT

## 2021-02-11 PROCEDURE — 99024 POSTOP FOLLOW-UP VISIT: CPT | Performed by: PHYSICIAN ASSISTANT

## 2021-02-11 PROCEDURE — 99024 POSTOP FOLLOW-UP VISIT: CPT | Performed by: ORTHOPAEDIC SURGERY

## 2021-02-11 PROCEDURE — 80048 BASIC METABOLIC PNL TOTAL CA: CPT | Performed by: PHYSICIAN ASSISTANT

## 2021-02-11 PROCEDURE — 97166 OT EVAL MOD COMPLEX 45 MIN: CPT

## 2021-02-11 PROCEDURE — 85014 HEMATOCRIT: CPT | Performed by: PHYSICIAN ASSISTANT

## 2021-02-11 PROCEDURE — 85018 HEMOGLOBIN: CPT | Performed by: PHYSICIAN ASSISTANT

## 2021-02-11 PROCEDURE — 97760 ORTHOTIC MGMT&TRAING 1ST ENC: CPT

## 2021-02-11 RX ORDER — OXYCODONE HYDROCHLORIDE AND ACETAMINOPHEN 5; 325 MG/1; MG/1
1 TABLET ORAL EVERY 4 HOURS PRN
Qty: 40 TABLET | Refills: 0 | Status: SHIPPED | OUTPATIENT
Start: 2021-02-11 | End: 2021-02-21

## 2021-02-11 RX ORDER — CEPHALEXIN 500 MG/1
500 CAPSULE ORAL EVERY 8 HOURS SCHEDULED
Qty: 15 CAPSULE | Refills: 0 | Status: SHIPPED | OUTPATIENT
Start: 2021-02-11 | End: 2021-02-16

## 2021-02-11 RX ORDER — ASPIRIN 325 MG
325 TABLET, DELAYED RELEASE (ENTERIC COATED) ORAL DAILY
Qty: 30 TABLET | Refills: 0 | Status: SHIPPED | OUTPATIENT
Start: 2021-02-11

## 2021-02-11 RX ORDER — MELOXICAM 15 MG/1
15 TABLET ORAL DAILY
Qty: 30 TABLET | Refills: 0 | Status: SHIPPED | OUTPATIENT
Start: 2021-02-11

## 2021-02-11 RX ADMIN — CEFAZOLIN SODIUM 1000 MG: 1 SOLUTION INTRAVENOUS at 00:57

## 2021-02-11 RX ADMIN — FONDAPARINUX SODIUM 2.5 MG: 2.5 INJECTION, SOLUTION SUBCUTANEOUS at 08:35

## 2021-02-11 RX ADMIN — OXYCODONE HYDROCHLORIDE AND ACETAMINOPHEN 1 TABLET: 5; 325 TABLET ORAL at 01:39

## 2021-02-11 RX ADMIN — OXYCODONE HYDROCHLORIDE AND ACETAMINOPHEN 1 TABLET: 5; 325 TABLET ORAL at 10:18

## 2021-02-11 RX ADMIN — CEFAZOLIN SODIUM 1000 MG: 1 SOLUTION INTRAVENOUS at 08:35

## 2021-02-11 RX ADMIN — DOCUSATE SODIUM 100 MG: 100 CAPSULE, LIQUID FILLED ORAL at 08:35

## 2021-02-11 NOTE — CASE MANAGEMENT
Evaluated the pt at the bedside  Pt was admitted to the hospital for Tibia/fibula fx  Pt had surgical repair yesterday  Explained the role of CM and the options of discharge planning with the pt  Pt states he lives ben 2 story home with a roommate  Pt has 1 EMILIE and 14 steps upstairs to the bathroom  Pt has been sleeping on the couch  Pt states he had a walking boot, however has a different lighter boot now  Pt has crutches at home  Pt does not have a PCP  Provided the [employer] card to obtain a PCP  Pt would [facility]  AS per Basilio Banner PA the pt will go home on ASA and not Arixtra  Pt brother will transport home upon discharge  Pt needs a PT/OT evaluation  Pt does not have any health insurance  Pt indicated he had been called   Notified Delores Charles the financial counselor, left a VM  Pt states his brother will transport him home upon discharge  Patient/caregiver received discharge checklist   Content reviewed  Patient/caregiver encouraged to participate in discharge plan of care prior to discharge home  CM reviewed d/c planning process including the following: identifying help at home, patient preference for d/c planning needs, availability of treatment team to discuss questions or concerns patient and/or family may have regarding understanding medications and recognizing signs and symptoms once discharged  CM also encouraged patient to follow up with all recommended appointments after discharge  Patient advised of importance for patient and family to participate in managing patients medical well being

## 2021-02-11 NOTE — OCCUPATIONAL THERAPY NOTE
Occupational Therapy Evaluation      Osmani Hart    2/11/2021    Patient Active Problem List   Diagnosis    Fracture tibia/fibula, right, sequela       History reviewed  No pertinent past medical history  Past Surgical History:   Procedure Laterality Date    NO PAST SURGERIES          02/11/21 0931   OT Last Visit   OT Visit Date 02/11/21   Note Type   Note type Evaluation   Restrictions/Precautions   Weight Bearing Precautions Per Order Yes   RLE Weight Bearing Per Order NWB   Braces or Orthoses CAM Boot   Other Precautions Fall Risk;Pain;WBS   Pain Assessment   Pain Assessment Tool 0-10   Pain Score 3   Pain Location/Orientation Orientation: Right;Location: Foot   Pain Onset/Description Onset: Ongoing;Frequency: Constant/Continuous; Descriptor: [address] Pain Intervention(s) Ambulation/increased activity;Repositioned;Medication (See MAR)   Home Living   Type of [address] [place] [address] Two level;Bed/bath upstairs;Stairs to enter with rails  (1 EMILIE, full flight to 2nd floor )   Bathroom Shower/Tub Tub/shower unit   [employer]  (on 2nd floor )   Bathroom Equipment Grab bars in [address]  (has been utlizing crutches recently )   Additional Comments Pt has been sleeping on 1st floor on couch     Prior Function   Level of [place] Independent with ADLs and functional mobility   Lives With Friend(s)  (Roommate )   Receives Help From Family   ADL Assistance Independent   IADLs Independent   Falls in the last 6 months 1 to 4  (2 falls reported )   Vocational Other (Comment)  (currently on leave of absence from work)   Lifestyle   Autonomy Patient reporting being independent with ADLs/IADLs, ambulatory with crutches, and lives with a roommate in a two story house    Reciprocal Relationships supportive roommate    Service to Others currently on leave of absence from work   Intrinsic Gratification enjoys hiking    ADL   Eating Assistance 7  Independent   Grooming Assistance 7  Independent   UB Bathing Assistance 6  Modified Independent   LB Bathing Assistance 5  Supervision/Setup   UB Dressing Assistance 5  Supervision/Setup   LB Dressing Assistance 5  Supervision/Setup   Toileting Assistance  5  Supervision/Setup   Bed Mobility   Supine to Sit 6  Modified independent   Additional items HOB elevated; Bedrails   Sit to Supine   (DNT: pt ambulating in hallway with PT Razia present )   Transfers   Sit to Stand 5  Supervision   Additional items Assist x 1; Increased time required;Verbal cues   Stand to Sit 5  Supervision   Additional items Assist x 1; Increased time required;Verbal cues   Functional Mobility   Functional Mobility 5  Supervision   Additional Comments Pt ambulated in hallway with no LOB or SOB  Pt maintained NWB status independently    Additional items Rolling walker   Balance   Static Sitting Normal   Dynamic Sitting Good   Static Standing Fair +   Dynamic Standing Fair   Activity Tolerance   Activity Tolerance Patient tolerated treatment well   Nurse Made Aware NISH Shell made aware of outcomes    RUE Assessment   RUE Assessment WFL   LUE Assessment   LUE Assessment WFL   Hand Function   Gross Motor Coordination Functional   Fine Motor Coordination Functional   Sensation   Light Touch No apparent deficits  (per pt )   Vision-Basic Assessment   Current Vision Does not wear glasses   Cognition   Overall Cognitive Status WFL   Arousal/Participation Alert; Responsive; Cooperative   Attention Within functional limits   Orientation Level Oriented X4   Memory Within functional limits   Following Commands Follows all commands and directions without difficulty   Comments Pt agreeable to OT eval    Assessment   Limitation Decreased ADL status; Decreased endurance;Decreased self-care trans;Decreased high-level ADLs   Prognosis Good   Assessment Patient is a 60 y o  male seen for OT evaluation s/p admit to [facility]  on 2/10/2021 w/Fracture tibia/fibula, right, sequela  Pt slipped and fell on 2/3/21, imaging revealed "oblique spiral displaced fracture of the tibia with mild lateral displacement and comminuted fracture of the fibula"  Pt is presently POD #1 s/p insertion nail IM tibia with ORIF of lateral malleoli  Orders placed for OT evaluation and treatment and activity-beginning POD #0  Performed at least two patient identifiers during session including name and wristband  Prior to admission, Patient reporting being independent with ADLs/IADLs, ambulatory with crutches, and lives with a roommate in a two story house  Personal factors affecting patient at time of initial evaluation include: steps to enter, difficulty performing ADLs and difficulty performing IADLs  Upon evaluation, patient requires modified independent and supervision assist for UB ADLs, modified independent and supervision assist for LB ADLs, transfers and functional ambulation in room and bathroom with supervision assist, with the use of Rolling Walker  Patient is oriented x 4 and presents with ability to recognize a problem, define a problem, identify alternative plan, select a plan, organize steps in a plan, implement plan and evaluate outcome (problem solving)  Occupational performance is affected by the following deficits: dynamic sit/ stand balance deficit with poor standing tolerance time for self care and functional mobility, decreased activity tolerance, increased pain and orthopedic restrictions  Patient to benefit from continued Occupational Therapy treatment while in the hospital to address deficits as defined above and maximize level of functional independence with ADLs and functional mobility   Occupational Performance areas to address include: grooming , bathing/ shower, dressing, toilet hygiene, transfer to all surfaces, functional ambulation, medication routine/ management, IADLS: Household maintenance, IADLs: safety procedures and IADLs: meal prep/ clean up  From OT standpoint, recommendation at time of d/c would be return to previous environment with social support  Goals   Patient Goals to go home soon    Plan   Treatment Interventions ADL retraining;Functional transfer training;Equipment evaluation/education;Patient/family training   Goal Expiration Date 02/21/21   OT Treatment Day 0   OT Frequency 3-5x/wk   Recommendation   OT Discharge Recommendation Return to previous environment with social support   Equipment Recommended Bedside commode  (for 1st floor set-up )   OT - OK to Discharge Yes  (Once medically cleared )   AM-PAC Daily Activity Inpatient   Lower Body Dressing 3   Bathing 3   Toileting 3   Upper Body Dressing 4   Grooming 4   Eating 4   Daily Activity Raw Score 21   Daily Activity Standardized Score (Calc for Raw Score >=11) 44 27   AM-PAC Applied Cognition Inpatient   Following a Speech/Presentation 4   Understanding Ordinary Conversation 4   Taking Medications 4   Remembering Where Things Are Placed or Put Away 4   Remembering List of 4-5 Errands 4   Taking Care of Complicated Tasks 4   Applied Cognition Raw Score 24   Applied Cognition Standardized Score 62 21     GOALS:    *ADL transfers with (I) for inc'd independence with ADLs/purposeful tasks    *UB ADL with (I) for inc'd independence with self cares    *LB ADL with (I) using AE prn for inc'd independence with self cares    *Toileting with (I) for clothing management and hygiene for return to PLOF with personal care    *Increase stand tolerance x10 m for inc'd tolerance with standing purposeful tasks    *Participate in 10m UE therex to increase overall stamina/activity tolerance for purposeful tasks    *Bed mobility- (I) for inc'd independence to manage own comfort and initiate EOB & OOB purposeful tasks    *Patient will verbalize 3 safety awareness/ principles to prevent falls in the home setting       *Patient will increase OOB/sitting tolerance to 2-4 hours per day to increase participation in self-care and leisure tasks with no s/s of exertion         Roni Fleming MS, OTR/L

## 2021-02-11 NOTE — PROGRESS NOTES
Progress Note - Orthopedics   Vale Nissen 60 y o  male MRN: [id number]  Unit/Bed#: -01 Encounter: [id number]    Assessment:  60 YOM status post x1 day insertion nail IM tibia with ORIF of lateral malleoli of right lower extremity  The patient is doing well this morning  Continues to complain of moderate pain in the right ankle and foot  Dressings were changed this morning without complication  Surgical incisions are clean, dry, and intact without signs of infection  The patient states he is ready to go home today  Dr Lilliam Owens is in agreement with this plan  Plan:  Patient will be discharged home today, he will be picked up by his brother  Continue to maintain dressings and wear walker boot  Keep clean, dry, and intact  Do not get dressings wet  Once discharged, patient will follow up with outpatient orthopedics in approximately 2 weeks    Weight bearing: Non-weight bearing right lower extremity     VTE Pharmacologic Prophylaxis: Arixtra 2 5mg daily until discharge  Once discharged, ASA 325mg daily until next outpatient visit in approximately 2 weeks    VTE Mechanical Prophylaxis: sequential compression device on lower left extremity  Subjective: 55 YOM status post x1 day insertion nail IM tibia with ORIF of lateral malleoli of the right lower extremity  Initial date of injury 02/03/2021 when the patient slipped and fell on ice  Today the patient states that he still has moderate amount of pain in the right ankle and foot  He states that the dressings "feel too tight"  He denies numbness or tingling  Overnight the nurse noted blood soaking through the ace wrap and reinforced the dressings as needed  The patient states that he is ready to go home and that his brother will be picking him up  Vitals: Blood pressure 160/76, pulse 58, temperature 98 1 °F (36 7 °C), temperature source Tympanic, resp  rate 18, height 5' 6" (1 676 m), weight 56 7 kg (125 lb), SpO2 97 %  ,Body mass index is 20 18 kg/m²  Intake/Output Summary (Last 24 hours) at 2/11/2021 0846  Last data filed at 2/11/2021 0300  Gross per 24 hour   Intake 1600 ml   Output 500 ml   Net 1100 ml       Invasive Devices     Peripheral Intravenous Line            Peripheral IV Left Antecubital -- days    Peripheral IV 02/10/21 Left Hand less than 1 day                Physical Exam: /76 (BP Location: Left arm)   Pulse 58   Temp 98 1 °F (36 7 °C) (Tympanic)   Resp 18   Ht 5' 6" (1 676 m)   Wt 56 7 kg (125 lb)   SpO2 97%   BMI 20 18 kg/m²     General Appearance:    Alert, cooperative, no distress, appears stated age   Head:    Normocephalic, without obvious abnormality, atraumatic   Eyes:    PERRL, conjunctiva/corneas clear        Ears:    Normal TM's and external ear canals   Nose:   Nares normal, septum midline, mucosa normal, no drainage   Throat:   Lips, mucosa, and tongue normal; teeth and gums normal   Neck:   Supple, symmetrical, trachea midline   Lungs:     respirations unlabored   Heart:    Regular rate and rhythm   Abdomen:     Soft, non-tender   Pulses:   2+ and symmetric all extremities   Skin:   Skin color, texture, turgor normal, no rashes or lesions   Neurologic:   CNII-XII intact  Normal strength, sensation and reflexes       throughout     Ortho Exam:    Right lower extremity- dressings applied from mid foot to just below the tibial tuberosity  Dressings were removed and changed  Surgical incisions without discharge, staples and sutures intact  Good rotation of movement in toes, toes are pink warm and dry, no swelling noted in toes, brisk capillary refill  Sensation of lower extremity intact to light touch       Lab, Imaging and other studies:   CBC:   Lab Results   Component Value Date    HGB 11 5 (L) 02/11/2021    HCT 34 7 (L) 02/11/2021     CMP:   Lab Results   Component Value Date    SODIUM 138 02/11/2021     02/11/2021    CO2 27 02/11/2021    BUN 13 02/11/2021    CREATININE 0 91 02/11/2021    CALCIUM 8 7 02/11/2021    EGFR 93 02/11/2021

## 2021-02-11 NOTE — PLAN OF CARE
Problem: Potential for Falls  Goal: Patient will remain free of falls  Description: INTERVENTIONS:  - Assess patient frequently for physical needs  -  Identify cognitive and physical deficits and behaviors that affect risk of falls    -  [place] fall precautions as indicated by assessment   - Educate patient/family on patient safety including physical limitations  - Instruct patient to call for assistance with activity based on assessment  - Modify environment to reduce risk of injury  - Consider OT/PT consult to assist with strengthening/mobility  Outcome: Progressing     Problem: Prexisting or High Potential for Compromised Skin Integrity  Goal: Skin integrity is maintained or improved  Description: INTERVENTIONS:  - Identify patients at risk for skin breakdown  - Assess and monitor skin integrity  - Assess and monitor nutrition and hydration status  - Monitor labs   - Assess for incontinence   - Turn and reposition patient  - Assist with mobility/ambulation  - Relieve pressure over bony prominences  - Avoid friction and shearing  - Provide appropriate hygiene as needed including keeping skin clean and dry  - Evaluate need for skin moisturizer/barrier cream  - Collaborate with interdisciplinary team   - Patient/family teaching  - Consider wound care consult   Outcome: Progressing     Problem: SKIN/TISSUE INTEGRITY - ADULT  Goal: Skin integrity remains intact  Description: INTERVENTIONS  - Identify patients at risk for skin breakdown  - Assess and monitor skin integrity  - Assess and monitor nutrition and hydration status  - Monitor labs (i e  albumin)  - Assess for incontinence   - Turn and reposition patient  - Assist with mobility/ambulation  - Relieve pressure over bony prominences  - Avoid friction and shearing  - Provide appropriate hygiene as needed including keeping skin clean and dry  - Evaluate need for skin moisturizer/barrier cream  - Collaborate with interdisciplinary team (i e  Nutrition, Rehabilitation, etc )   - Patient/family teaching  Outcome: Progressing  Goal: Incision(s), wounds(s) or drain site(s) healing without S/S of infection  Description: INTERVENTIONS  - Assess and document risk factors for skin impairment   - Assess and document dressing, incision, wound bed, drain sites and surrounding tissue  - Consider nutrition services referral as needed  - Oral mucous membranes remain intact  - Provide patient/ family education  Outcome: Progressing  Goal: Oral mucous membranes remain intact  Description: INTERVENTIONS  - Assess oral mucosa and hygiene practices  - Implement preventative oral hygiene regimen  - Implement oral medicated treatments as ordered  - Initiate Nutrition services referral as needed  Outcome: Progressing     Problem: MUSCULOSKELETAL - ADULT  Goal: Maintain or return mobility to safest level of function  Description: INTERVENTIONS:  - Assess patient's ability to carry out ADLs; assess patient's baseline for ADL function and identify physical deficits which impact ability to perform ADLs (bathing, care of mouth/teeth, toileting, grooming, dressing, etc )  - Assess/evaluate cause of self-care deficits   - Assess range of motion  - Assess patient's mobility  - Assess patient's need for assistive devices and provide as appropriate  - Encourage maximum independence but intervene and supervise when necessary  - Involve family in performance of ADLs  - Assess for home care needs following discharge   - Consider OT consult to assist with ADL evaluation and planning for discharge  - Provide patient education as appropriate  Outcome: Progressing  Goal: Maintain proper alignment of affected body part  Description: INTERVENTIONS:  - Support, maintain and protect limb and body alignment  - Provide patient/ family with appropriate education  Outcome: Progressing

## 2021-02-11 NOTE — PLAN OF CARE
Problem: Potential for Falls  Goal: Patient will remain free of falls  Description: INTERVENTIONS:  - Assess patient frequently for physical needs  -  Identify cognitive and physical deficits and behaviors that affect risk of falls    -  [place] fall precautions as indicated by assessment   - Educate patient/family on patient safety including physical limitations  - Instruct patient to call for assistance with activity based on assessment  - Modify environment to reduce risk of injury  - Consider OT/PT consult to assist with strengthening/mobility  Outcome: Progressing     Problem: Prexisting or High Potential for Compromised Skin Integrity  Goal: Skin integrity is maintained or improved  Description: INTERVENTIONS:  - Identify patients at risk for skin breakdown  - Assess and monitor skin integrity  - Assess and monitor nutrition and hydration status  - Monitor labs   - Assess for incontinence   - Turn and reposition patient  - Assist with mobility/ambulation  - Relieve pressure over bony prominences  - Avoid friction and shearing  - Provide appropriate hygiene as needed including keeping skin clean and dry  - Evaluate need for skin moisturizer/barrier cream  - Collaborate with interdisciplinary team   - Patient/family teaching  - Consider wound care consult   Outcome: Progressing     Problem: SKIN/TISSUE INTEGRITY - ADULT  Goal: Skin integrity remains intact  Description: INTERVENTIONS  - Identify patients at risk for skin breakdown  - Assess and monitor skin integrity  - Assess and monitor nutrition and hydration status  - Monitor labs (i e  albumin)  - Assess for incontinence   - Turn and reposition patient  - Assist with mobility/ambulation  - Relieve pressure over bony prominences  - Avoid friction and shearing  - Provide appropriate hygiene as needed including keeping skin clean and dry  - Evaluate need for skin moisturizer/barrier cream  - Collaborate with interdisciplinary team (i e  Nutrition, Rehabilitation, etc )   - Patient/family teaching  Outcome: Progressing  Goal: Incision(s), wounds(s) or drain site(s) healing without S/S of infection  Description: INTERVENTIONS  - Assess and document risk factors for skin impairment   - Assess and document dressing, incision, wound bed, drain sites and surrounding tissue  - Consider nutrition services referral as needed  - Oral mucous membranes remain intact  - Provide patient/ family education  Outcome: Progressing  Goal: Oral mucous membranes remain intact  Description: INTERVENTIONS  - Assess oral mucosa and hygiene practices  - Implement preventative oral hygiene regimen  - Implement oral medicated treatments as ordered  - Initiate Nutrition services referral as needed  Outcome: Progressing     Problem: MUSCULOSKELETAL - ADULT  Goal: Maintain or return mobility to safest level of function  Description: INTERVENTIONS:  - Assess patient's ability to carry out ADLs; assess patient's baseline for ADL function and identify physical deficits which impact ability to perform ADLs (bathing, care of mouth/teeth, toileting, grooming, dressing, etc )  - Assess/evaluate cause of self-care deficits   - Assess range of motion  - Assess patient's mobility  - Assess patient's need for assistive devices and provide as appropriate  - Encourage maximum independence but intervene and supervise when necessary  - Involve family in performance of ADLs  - Assess for home care needs following discharge   - Consider OT consult to assist with ADL evaluation and planning for discharge  - Provide patient education as appropriate  Outcome: Progressing  Goal: Maintain proper alignment of affected body part  Description: INTERVENTIONS:  - Support, maintain and protect limb and body alignment  - Provide patient/ family with appropriate education  Outcome: Progressing

## 2021-02-11 NOTE — NURSING NOTE
Call placed to Dr Ceci Lakhani  This nurse noted a moderate amount of bloody seeping through LLE surgical dressing  No new orders to re-enforce surgical dressing and ortho will re-evaluate in the AM   LLE remains elevated and ICE applied  Will monitor,

## 2021-02-11 NOTE — PHYSICAL THERAPY NOTE
Physical Therapy Evaluation     Patient's Name: Cornelius Tyson    Admitting Diagnosis  Tibia/fibula fracture [S82 209A, S82 409A]    Problem List  Patient Active Problem List   Diagnosis    Fracture tibia/fibula, right, sequela       Past Medical History  History reviewed  No pertinent past medical history  Past Surgical History  Past Surgical History:   Procedure Laterality Date    NO PAST SURGERIES              02/11/21 0929   PT Last Visit   PT Visit Date 02/11/21   Note Type   Note type Evaluation   Pain Assessment   Pain Assessment Tool 0-10   Pain Score 3   Pain Location/Orientation Orientation: Right;Location: Leg   Pain Onset/Description Onset: Ongoing;Frequency: Constant/Continuous   Hospital Pain Intervention(s) Repositioned; Ambulation/increased activity; Elevated; Emotional support;Rest;Splinting   Home Living   Type of [facility] Two level;Bed/bath upstairs;Stairs to enter with rails  (1 EMILIE, 14 steps to 2nd floor; pt sleeping on 1st flr)   Bathroom Shower/Tub Tub/shower unit  (on 2nd floor)   Bathroom Toilet Standard   Bathroom Equipment Grab bars in [address]   Prior Function   Level of [address] with ADLs and functional mobility   Lives With Friend(s)  ("roommate")   Receives Help From Family   ADL Assistance Independent   IADLs Independent   Falls in the last 6 months 1 to 4  (2 falls)   Vocational Other (Comment)  (currently on leave of absence from work)   Restrictions/Precautions   Wells Miladis Bearing Precautions Per Order Yes   RLE Weight Bearing Per Order NWB   Braces or Orthoses CAM Boot  (size medium- high option)   Other Precautions WBS; Fall Risk;Pain   General   Family/Caregiver Present No   Cognition   Overall Cognitive Status WFL   Arousal/Participation Alert   Orientation Level Oriented X4   Memory Within functional limits   Following Commands Follows all commands and directions without difficulty   Comments pt agreeable to PT session RLE Assessment   RLE Assessment X   Strength RLE   R Hip Flexion 4/5   R Knee Extension 4/5   LLE Assessment   LLE Assessment WFL   Coordination   Movements are Fluid and Coordinated 1   Sensation X   Light Touch   RLE Light Touch Absent  (pt reports continued numbness R toes)   LLE Light Touch Grossly intact   Bed Mobility   Supine to Sit 6  Modified independent   Additional items HOB elevated   Transfers   Sit to Stand 5  Supervision   Stand to Sit 5  Supervision   Additional Comments no overt LOB c mobility tasks  Pt denies any lightheadedness/dizziness  Pt able to maintain NWB on R % of gait cycle   Ambulation/Elevation   Gait pattern Antalgic; Step to;Excessively slow   Gait Assistance 5  Supervision   Additional items Assist x 1;Verbal cues   Assistive Device Rolling walker   Distance 125 ft   Stair Management Assistance Not tested   Balance   Static Sitting Good   Dynamic Sitting Fair +   Static Standing Fair +   Dynamic Standing Fair   Ambulatory Fair   Endurance Deficit   Endurance Deficit Yes   Activity Tolerance   Activity Tolerance Patient tolerated treatment well   Medical Staff Made Aware OT Walterine Sandifer PA-C   Nurse Made Aware Yes, NISH Bowie verbalized pt appropriate for PT session, made aware of outcomes/recs   Assessment   Prognosis Good   Problem List Decreased strength;Decreased range of motion; Impaired balance;Decreased mobility;Pain;Decreased skin integrity;Orthopedic restrictions   Assessment Pt is 60 y o  male seen for PT evaluation on 2/11/2021 s/p admit [address] on 2/10/2021 w/ Fracture tibia/fibula, right, sequela  Pt slipped and fell on 2/3/21, imaging revealed "oblique spiral displaced fracture of the tibia with mild lateral displacement and comminuted fracture of the fibula"  Pt is presently POD #1 s/p insertion nail IM tibia with ORIF of lateral malleoli  PT consulted to assess pt's functional mobility and d/c needs   Order placed for PT eval and tx, w/ NWB R LE and activity- beginning POD #0 orders  PT performed at least 2 patient identifiers during session: Name and wristband  Personal factors affecting pt at time of IE include: inaccessible home environment, lives in 2 story house, ambulating w/ assistive device, stairs to enter home, positive fall history, unable to perform physical activity, inability to perform IADLs and inability to perform ADLs  Please find objective findings from PT assessment regarding body systems outlined above with impairments and limitations including weakness, decreased ROM, impaired balance, gait deviations, decreased activity tolerance, fall risk, orthopedic restrictions and decreased skin integrity, as well as mobility assessment (need for cueing for mobility technique)  The following objective measures performed on IE also reveal limitations: Barthel Index: 60/100, Modified [place]: 2 (slight disability) and AM-PAC 6-Clicks: 07/24  Pt's clinical presentation is currently unstable/unpredictable seen in pt's presentation of ongoing medical assessment  Pt to benefit from continued PT tx to address deficits as defined above and maximize level of functional independent mobility and consistency  From PT/mobility standpoint, recommendation at time of d/c would be OP PT pending progress in order to facilitate return to PLOF     Barriers to Discharge Inaccessible home environment   Goals   Patient Goals "to get home"   STG Expiration Date 02/21/21   Short Term Goal #1 In 7-10 days: Increase bilateral LE strength 1/2 grade to facilitate independent mobility, Perform all transfers independently to improve independence, Ambulate > 150 ft  with axillary crutches modified independent w/o LOB and w/ normalized gait pattern 100% of the time, Navigate 1+ 14 stairs modified independent with unilateral handrail to facilitate return to previous living environment, Increase all balance 1/2 grade to decrease risk for falls, Complete exercise program independently and Tolerate 4 hr OOB to faciliate upright tolerance   PT Treatment Day 1   Plan   Treatment/Interventions Functional transfer training;LE strengthening/ROM; Elevations; Therapeutic exercise; Endurance training;Patient/family training;Gait training;Spoke to nursing;Spoke to case management   PT Frequency 5x/wk   Recommendation   PT Discharge Recommendation Home with skilled therapy  (OP PT when cleared by orthopedics)   Equipment Recommended Crutches  (pt reports having crutches at home)   PT - OK to Discharge Yes  (when medically cleared)   Additional Comments Pt's raw score on the [facility] [employer] inpatient short form is 19, standardized score is 42 48  Patients at this level are likely to benefit from DC to home, however, please refer to therapist recommendation for safe DC planning  AM-PAC Basic Mobility Inpatient   Turning in Bed Without Bedrails 4   Lying on Back to Sitting on Edge of Flat Bed 4   Moving Bed to Chair 3   Standing Up From Chair 3   Walk in Room 3   Climb 3-5 Stairs 2   Basic Mobility Inpatient Raw Score 19   Basic Mobility Standardized Score 42 48   Modified [place] Scale   Modified [place] Scale 2   Barthel Index   Feeding 10   Bathing 0   Grooming Score 5   Dressing Score 5   Bladder Score 10   Bowels Score 10   Toilet Use Score 10   Transfers (Bed/Chair) Score 10   Mobility (Level Surface) Score 0   Stairs Score 0   Barthel Index Score 60     Physical Therapy Treatment Note  Time In: 0929  Time Out: 0937  Total Time: 8 min     S:  Pt agreeable to PT treatment session s/p PT eval, in no apparent distress, A&O x 4 and responsive      O:  Pt seen for PT treatment session this date with interventions consisting of education of CAM boot donning/doffing  VC required for technique  Recommendations:  Pt sized w/ size MEDIUM HIGH CAM BOOT prior to PT mobility assessment, see PT evaluation for assessment findings   Pt educated on brace components, wearing schedule when OOB per MD recommendation, maintenance of brace, donning/doffing, skin inspection  Educated that brace may need to be repositioned throughout the day  Pt able to verbalize understanding w/ all components, demonstrate don/doff performance w/ verbal instruction 0% of the time from therapist  Education to [facility] staff on same, including don/doffing, wearing schedule, skin inspection of NSG q shift as well as assessing circulation for appropriate fit of brace  A:  Post session: pt returned back to recliner, all needs in reach and RN notified of session findings/recommendations      P:  Continue to recommend OP PT at time of d/c in order to maximize pt's functional independence and safety w/ mobility  Pt continues to be functioning below baseline level, and remains limited 2* factors listed above  PT will continue to see pt while here in order to address the deficits listed above and provide interventions consistent w/ POC in effort to achieve STGs      Walt Beltran, PT, DPT

## 2021-02-11 NOTE — ANESTHESIA POSTPROCEDURE EVALUATION
Post-Op Assessment Note    CV Status:  Stable  Pain Score: 4    Pain management: adequate     Mental Status:  Alert and awake   Hydration Status:  Euvolemic   PONV Controlled:  Controlled   Airway Patency:  Patent      Post Op Vitals Reviewed: Yes      Staff: CRNA         No complications documented      BP      Temp      Pulse     Resp      SpO2

## 2021-02-11 NOTE — PLAN OF CARE
Problem: OCCUPATIONAL THERAPY ADULT  Goal: Performs self-care activities at highest level of function for planned discharge setting  See evaluation for individualized goals  Description: Treatment Interventions: ADL retraining, Functional transfer training, Equipment evaluation/education, Patient/family training  Equipment Recommended: Bedside commode(for 1st floor set-up )       See flowsheet documentation for full assessment, interventions and recommendations  Note: Limitation: Decreased ADL status, Decreased endurance, Decreased self-care trans, Decreased high-level ADLs  Prognosis: Good  Assessment: Patient is a 60 y o  male seen for OT evaluation s/p admit to [address] [place] [address]  on 2/10/2021 w/Fracture tibia/fibula, right, sequela  Pt slipped and fell on 2/3/21, imaging revealed "oblique spiral displaced fracture of the tibia with mild lateral displacement and comminuted fracture of the fibula"  Pt is presently POD #1 s/p insertion nail IM tibia with ORIF of lateral malleoli  Orders placed for OT evaluation and treatment and activity-beginning POD #0  Performed at least two patient identifiers during session including name and wristband  Prior to admission, Patient reporting being independent with ADLs/IADLs, ambulatory with crutches, and lives with a roommate in a two story house  Personal factors affecting patient at time of initial evaluation include: steps to enter, difficulty performing ADLs and difficulty performing IADLs  Upon evaluation, patient requires modified independent and supervision assist for UB ADLs, modified independent and supervision assist for LB ADLs, transfers and functional ambulation in room and bathroom with supervision assist, with the use of Rolling Walker    Patient is oriented x 4 and presents with ability to recognize a problem, define a problem, identify alternative plan, select a plan, organize steps in a plan, implement plan and evaluate outcome (problem solving)  Occupational performance is affected by the following deficits: dynamic sit/ stand balance deficit with poor standing tolerance time for self care and functional mobility, decreased activity tolerance, increased pain and orthopedic restrictions  Patient to benefit from continued Occupational Therapy treatment while in the hospital to address deficits as defined above and maximize level of functional independence with ADLs and functional mobility  Occupational Performance areas to address include: grooming , bathing/ shower, dressing, toilet hygiene, transfer to all surfaces, functional ambulation, medication routine/ management, IADLS: Household maintenance, IADLs: safety procedures and IADLs: meal prep/ clean up  From OT standpoint, recommendation at time of d/c would be return to previous environment with social support         OT Discharge Recommendation: Return to previous environment with social support  OT - OK to Discharge: Yes(Once medically cleared )     Marina Junior OT

## 2021-02-11 NOTE — DISCHARGE SUMMARY
Discharge Summary - Andrew Titus 60 y o  male MRN: [id number]    Unit/Bed#: -01 Encounter: [id number]    Admission Date:     Admitting Diagnosis: Tibia/fibula fracture [S82 209A, S82 409A]    Procedures Performed:  Insertion nail IM right tibia    Summary of Hospital Course:   A 57-year-old male presents to the hospital for insertion nail IM of his right tibia after suffering a tibia fibula fracture  The patient tolerated the procedure quite well  On postop day 1, he was deemed suitable for discharge to home  The patient's dressings were changed prior to discharge  He should remain in the boot and be nonweightbearing  He should leave his dressings on until his follow-up appointment in 2 weeks for suture and staple removal   He should take aspirin 325 mg daily for DVT prophylaxis  Discharge Diagnosis:  Tibia/fibula fracture, s/p Insertion nail IM right tibia    Condition at Discharge: stable       Discharge instructions/Information to patient and family:   See after visit summary for information provided to patient and family  Provisions for Follow-Up Care:  See after visit summary for information related to follow-up care and any pertinent home health orders  PCP: No primary care provider on file  Disposition: Home    Planned Readmission: No      Discharge Statement   I spent 30 minutes discharging the patient  This time was spent on the day of discharge  I had direct contact with the patient on the day of discharge  Additional documentation is required if more than 30 minutes were spent on discharge  Discharge Medications:  See after visit summary for reconciled discharge medications provided to patient and family

## 2021-02-11 NOTE — DISCHARGE INSTRUCTIONS
POSTOPERATIVE INFORMATION  ANKLE/FOOT SURGERY    1  Keep your leg elevated as much as possible during the first 48 hours after surgery to minimize swelling  You may apply an ice pack as needed to control swelling and reduce pain  2  Wiggle your toes as tolerated, regularly in all directions  3  Remain nonweightbearing, (no weight on the foot) use crutches or a walker until you are seen in our office for follow-up  4  If the bandage begins to feel too tight, you may unwrap and loosen it  You should do this if you notice significant swelling  5  You may change the dressing after 2 days or if it gets wet or dirty  6  You may shower after 3 days, but keep your leg/foot and dressing dry  7  Call our office if you experience pain not controlled by your medicine, develop a fever, or experience increased drainage or redness around the incision site  8  If a postop follow-up appointment is not arranged before you leave the hospital, call our office the day following surgery to schedule this appointment  9  You should wear the boot, if provided, at all times

## 2021-02-11 NOTE — PLAN OF CARE
Problem: PHYSICAL THERAPY ADULT  Goal: Performs mobility at highest level of function for planned discharge setting  See evaluation for individualized goals  Description: Treatment/Interventions: Functional transfer training, LE strengthening/ROM, Elevations, Therapeutic exercise, Endurance training, Patient/family training, Gait training, Spoke to nursing, Spoke to case management  Equipment Recommended: Crutches(pt reports having crutches at home)       See flowsheet documentation for full assessment, interventions and recommendations  Note: Prognosis: Good  Problem List: Decreased strength, Decreased range of motion, Impaired balance, Decreased mobility, Pain, Decreased skin integrity, Orthopedic restrictions  Assessment: Pt is 60 y o  male seen for PT evaluation on 2/11/2021 s/p admit [address] on 2/10/2021 w/ Fracture tibia/fibula, right, sequela  Pt slipped and fell on 2/3/21, imaging revealed "oblique spiral displaced fracture of the tibia with mild lateral displacement and comminuted fracture of the fibula"  Pt is presently POD #1 s/p insertion nail IM tibia with ORIF of lateral malleoli  PT consulted to assess pt's functional mobility and d/c needs  Order placed for PT eval and tx, w/ NWB R LE and activity- beginning POD #0 orders  PT performed at least 2 patient identifiers during session: Name and wristband  Personal factors affecting pt at time of IE include: inaccessible home environment, lives in 2 story house, ambulating w/ assistive device, stairs to enter home, positive fall history, unable to perform physical activity, inability to perform IADLs and inability to perform ADLs   Please find objective findings from PT assessment regarding body systems outlined above with impairments and limitations including weakness, decreased ROM, impaired balance, gait deviations, decreased activity tolerance, fall risk, orthopedic restrictions and decreased skin integrity, as well as mobility assessment (need for cueing for mobility technique)  The following objective measures performed on IE also reveal limitations: Barthel Index: 60/100, Modified [place]: 2 (slight disability) and AM-PAC 6-Clicks: 35/30  Pt's clinical presentation is currently unstable/unpredictable seen in pt's presentation of ongoing medical assessment  Pt to benefit from continued PT tx to address deficits as defined above and maximize level of functional independent mobility and consistency  From PT/mobility standpoint, recommendation at time of d/c would be OP PT pending progress in order to facilitate return to PLOF  Barriers to Discharge: Inaccessible home environment     PT Discharge Recommendation: Home with skilled therapy(OP PT when cleared by orthopedics)     PT - OK to Discharge: Yes(when medically cleared)    See flowsheet documentation for full assessment

## 2021-02-25 ENCOUNTER — TELEPHONE (OUTPATIENT)
Dept: OBGYN CLINIC | Facility: HOSPITAL | Age: 56
End: 2021-02-25

## 2021-02-25 NOTE — TELEPHONE ENCOUNTER
Patient sees Dr Kyaw Camacho  Patient is calling in stating that he had surgery on 2/10 for his right lower leg and he has been keeping it dry  The patient is calling in wanting to know if he is able to wash the area up and take a bath before he comes into the office tomorrow? He is asking for a call back relating this          Call back# [phone]

## 2021-02-26 ENCOUNTER — OFFICE VISIT (OUTPATIENT)
Dept: OBGYN CLINIC | Facility: CLINIC | Age: 56
End: 2021-02-26

## 2021-02-26 ENCOUNTER — TELEPHONE (OUTPATIENT)
Dept: OBGYN CLINIC | Facility: HOSPITAL | Age: 56
End: 2021-02-26

## 2021-02-26 VITALS
WEIGHT: 125 LBS | BODY MASS INDEX: 20.09 KG/M2 | SYSTOLIC BLOOD PRESSURE: 128 MMHG | HEART RATE: 85 BPM | DIASTOLIC BLOOD PRESSURE: 71 MMHG | HEIGHT: 66 IN

## 2021-02-26 DIAGNOSIS — S82.401D CLOSED FRACTURE OF RIGHT TIBIA AND FIBULA WITH ROUTINE HEALING, SUBSEQUENT ENCOUNTER: Primary | ICD-10-CM

## 2021-02-26 DIAGNOSIS — S82.201D CLOSED FRACTURE OF RIGHT TIBIA AND FIBULA WITH ROUTINE HEALING, SUBSEQUENT ENCOUNTER: Primary | ICD-10-CM

## 2021-02-26 PROCEDURE — 99024 POSTOP FOLLOW-UP VISIT: CPT | Performed by: ORTHOPAEDIC SURGERY

## 2021-02-26 NOTE — TELEPHONE ENCOUNTER
Spoke to patient  Advised above  Advised that he should wear it when sleeping as well  Verbalized understanding

## 2021-02-26 NOTE — TELEPHONE ENCOUNTER
The patient has a Cam boot  He should wear it most the day  If he is sitting on a couch or recliner, he can remove it as long as he is awake    Any time he ambulates, he needs to use the boot

## 2021-02-26 NOTE — TELEPHONE ENCOUNTER
Patient sees Dr Aurea Olvera  Patient would like to know how long during the day he should wear his air cast  Does he take it off at all during the day or at night to sleep?           CB: [phone]

## 2021-02-26 NOTE — PROGRESS NOTES
Assessment/Plan:    No problem-specific Assessment & Plan notes found for this encounter  Diagnoses and all orders for this visit:    Closed fracture of right tibia and fibula with routine healing, subsequent encounter  -     XR tibia fibula 2 vw right; Future           continue toe-touch weight-bearing right lower extremity  Continue Ecotrin  Return back in 1 month for re-evaluation with new x-rays of right tibia / fibula-two views    Subjective:      Patient ID: Eze Cotter is a 60 y o  male  HPI      the patient is 16 days status post intramedullary rodding of his right tibia  His pain has been under control  He denies any numbness or tingling  He is ambulating with a boot and crutches  The following portions of the patient's history were reviewed and updated as appropriate: allergies, current medications, past family history, past medical history, past social history, past surgical history and problem list     Review of Systems   Constitutional: Negative for chills, fever and unexpected weight change  HENT: Negative for hearing loss, nosebleeds and sore throat  Eyes: Negative for pain, redness and visual disturbance  Respiratory: Negative for cough, shortness of breath and wheezing  Cardiovascular: Negative for chest pain, palpitations and leg swelling  Gastrointestinal: Negative for abdominal pain, nausea and vomiting  Endocrine: Negative for polydipsia and polyuria  Genitourinary: Negative for dysuria and hematuria  Musculoskeletal: Positive for arthralgias, gait problem and myalgias  Negative for back pain, joint swelling, neck pain and neck stiffness  As noted in HPI   Skin: Negative for rash and wound  Neurological: Negative for dizziness, numbness and headaches  Psychiatric/Behavioral: Negative for decreased concentration and suicidal ideas  The patient is not nervous/anxious            Objective:      /71 (BP Location: Left arm, Patient Position: Sitting, Cuff Size: Standard)   Pulse 85   Ht 5' 6" (1 676 m)   Wt 56 7 kg (125 lb)   BMI 20 18 kg/m²          Physical Exam        Right lower extremity is neurovascular intact  Toes are pink and mobile  Compartments are soft  Incisions are clean, dry, intact  Alignment is neutral   Negative Homans  Sutures removed Steri-Strips applied  X-rays show the fracture to be good alignment    There is no callus formation

## 2021-03-01 ENCOUNTER — EVALUATION (OUTPATIENT)
Dept: PHYSICAL THERAPY | Facility: CLINIC | Age: 56
End: 2021-03-01
Payer: COMMERCIAL

## 2021-03-01 DIAGNOSIS — S82.401S FRACTURE TIBIA/FIBULA, RIGHT, SEQUELA: Primary | ICD-10-CM

## 2021-03-01 DIAGNOSIS — M25.561 RIGHT KNEE PAIN, UNSPECIFIED CHRONICITY: ICD-10-CM

## 2021-03-01 DIAGNOSIS — M25.571 RIGHT ANKLE PAIN, UNSPECIFIED CHRONICITY: ICD-10-CM

## 2021-03-01 DIAGNOSIS — S82.201S FRACTURE TIBIA/FIBULA, RIGHT, SEQUELA: Primary | ICD-10-CM

## 2021-03-01 DIAGNOSIS — R26.9 GAIT ABNORMALITY: ICD-10-CM

## 2021-03-01 PROCEDURE — 97162 PT EVAL MOD COMPLEX 30 MIN: CPT | Performed by: PHYSICAL THERAPIST

## 2021-03-01 NOTE — PROGRESS NOTES
PT Evaluation     Today's date: 3/1/2021  Patient name: Darian Mendoza  : 1965  MRN: [id number]  Referring provider: Cherrie Henderson  Dx:   Encounter Diagnosis     ICD-10-CM    1  Gait abnormality  R26 9    2  Fracture tibia/fibula, right, sequela  S82 201S Ambulatory referral to Physical Therapy    S82 401S    3  Right ankle pain, unspecified chronicity  M25 571    4  Right knee pain, unspecified chronicity  M25 561        Start Time: 1115  Stop Time: 1200  Total time in clinic (min): 45 minutes    Assessment  Assessment details: Darian Mendoza was seen for an initial PT evaluation today  Patient is a 60 y o  male with diagnosis of right tib fib fracture  Moderate complexity evaluation  due to number of participation restrictions, functional outcome measure of 71% limitation, and changing clinical presentation  Findings today show limitation in right knee and ankle range of motion with LE weakness, decreased stability, pain, and weightbearing restriction consistent with status post diagnosis  Skilled PT indicated to treat at this time to address range of motion and muscle activation progressing to strengthening per protocol to return patient to PLOF  Impairments: abnormal gait, abnormal muscle firing, abnormal muscle tone, abnormal or restricted ROM, abnormal movement, activity intolerance, impaired balance, impaired physical strength, lacks appropriate home exercise program, pain with function, safety issue and weight-bearing intolerance    Goals  STG (6 weeks)  1  Patient will have reported 0/10 pain in right LE at rest    2  Improve patient's right knee to 120 degrees for increased ability to take proper strides during ambulation  3  Increase patient's right single leg balance to 2 seconds for increased stability on stairs  LTG (12 weeks)  1  Patient's LE strength will be equal bilaterally for ability to ambulate and return to functional activities at PLOF     2  Patient will be able to walk in community and return to work with 0/10 pain in right LE  3  Patient will be independent with home exercise program for continued maintenance post PT discharge  Plan  Plan details: Progress note in 4 weeks  Patient would benefit from: skilled physical therapy  Planned modality interventions: unattended electrical stimulation, thermotherapy: hydrocollator packs and cryotherapy  Planned therapy interventions: manual therapy, neuromuscular re-education, self care, home exercise program, gait training, therapeutic exercise and therapeutic activities  Frequency: 2-3x/week  Duration in weeks: 12  Plan of Care beginning date: 3/1/2021  Plan of Care expiration date: 2021  Treatment plan discussed with: patient and PTA        Subjective Evaluation    History of Present Illness  Date of onset: 2021  Date of surgery: 2/10/2021  Mechanism of injury: Agustina Davila is a 60 y o  male who presents to outpatient Physical Therapy today with complaints of right low leg pain  [facility] when slipping on ice at the beginning of 2021  States right leg went sideways and had immediate pain  Taken by ambulance to ED Dx with right tib and fib fx  Surgery 1 week later with most recent f/u 1 week ago where staples were removed  Patient instructed to wear CAM boot when moving around home and at night, TTWB  Pain  Current pain ratin  At best pain ratin  At worst pain ratin  Location: anterior low leg, proximal and distally  Quality: sharp  Relieving factors: relaxation  Progression: improved          Objective     Observations     Additional Observation Details  2 incisions at ankle, 2 at medial knee covered in steri strips, healing well no redness or drainage  Min-trace edema       Tenderness     Additional Tenderness Details  TTP generalized to right ankle no pain at knee or low leg    Neurological Testing     Sensation     Knee   Left Knee   Intact: light touch    Right Knee   Diminished: light touch Comments   Right light touch: at low leg  Active Range of Motion   Left Knee   Flexion: 140 degrees   Extension: 8 degrees     Right Knee   Flexion: 105 degrees with pain  Extension: 2 degrees with pain  Left Ankle/Foot   Dorsiflexion (ke): 15 degrees   Plantar flexion: 50 degrees   Inversion: 38 degrees   Eversion: 20 degrees     Right Ankle/Foot   Dorsiflexion (ke): -18 degrees   Plantar flexion: 45 degrees   Inversion: 15 degrees   Eversion: 0 degrees     Strength/Myotome Testing     Left Knee   Normal strength    Right Knee   Quadriceps contraction: fair    Left Ankle/Foot   Normal strength    Additional Strength Details  Right knee strength NT at intake  Right ankle strength NT at intake    General Comments:      Knee Comments  Gait: 2 axillary crutches, NWB, no CAM boot    FOTO: 29% function, 60% predicted function         Flowsheet Rows      Most Recent Value   PT/OT G-Codes   Current Score  29   Projected Score  60             Precautions: TTWB in CAM boot  Progress note: 4/1  POC: 6/1    Manuals 3/1       PROM knee and ankle                        Pt  Ed woundcare, WB precautions, use of CAM boot       Neuro Re-Ed                QS :05x10       Glut set                                        Ther Ex        Ankle pumps 30x       Ankle ABC 1x       Hamstring stretch 3x:30       Heel slide flex, abd Flex 10x       SLR  *      SAQ  *      LAQ 10x       Ball squeeze        Clamshell        [place]   *      Towel scrunch  *                                                      Ther Activity                                Gait Training        Mirror gait with vc                Modalities        CP                  Access Code: [email or web address]   URL: [email or web address]   Date: 03/01/2021   Prepared by: Candy Phelps     Exercises  Ankle Pumps in Elevation - 10 reps - 3 sets - 3x daily - 7x weekly  Ankle Alphabet in Elevation - 10 reps - 3 sets - 3x daily - 7x weekly  Supine Heel Slide - 10 reps - 3 sets - 3x daily - 7x weekly  Long Sitting Quad Set - 10 reps - 1 sets - 5 sec hold - 3x daily - 7x weekly  Seated Long Arc Quad - 10 reps - 3 sets - 1x daily - 7x weekly  Seated Hamstring Stretch - 3 reps - 1 sets - 30 sec hold - 3x daily - 7x weekly

## 2021-03-05 ENCOUNTER — OFFICE VISIT (OUTPATIENT)
Dept: PHYSICAL THERAPY | Facility: CLINIC | Age: 56
End: 2021-03-05
Payer: COMMERCIAL

## 2021-03-05 DIAGNOSIS — M25.561 RIGHT KNEE PAIN, UNSPECIFIED CHRONICITY: ICD-10-CM

## 2021-03-05 DIAGNOSIS — S82.401S FRACTURE TIBIA/FIBULA, RIGHT, SEQUELA: Primary | ICD-10-CM

## 2021-03-05 DIAGNOSIS — R26.9 GAIT ABNORMALITY: ICD-10-CM

## 2021-03-05 DIAGNOSIS — M25.571 RIGHT ANKLE PAIN, UNSPECIFIED CHRONICITY: ICD-10-CM

## 2021-03-05 DIAGNOSIS — S82.201S FRACTURE TIBIA/FIBULA, RIGHT, SEQUELA: Primary | ICD-10-CM

## 2021-03-05 PROCEDURE — 97110 THERAPEUTIC EXERCISES: CPT | Performed by: PHYSICAL THERAPIST

## 2021-03-05 PROCEDURE — 97140 MANUAL THERAPY 1/> REGIONS: CPT | Performed by: PHYSICAL THERAPIST

## 2021-03-05 NOTE — PROGRESS NOTES
Daily Note     Today's date: 3/5/2021  Patient name: Carmen Chen  : 1965  MRN: [id number]  Referring provider: Feliz Hester  Dx:   Encounter Diagnosis     ICD-10-CM    1  Fracture tibia/fibula, right, sequela  S82 201S     S82 401S    2  Gait abnormality  R26 9    3  Right ankle pain, unspecified chronicity  M25 571    4  Right knee pain, unspecified chronicity  M25 561        Start Time: 0845  Stop Time: 915  Total time in clinic (min): 30 minutes    Subjective: Patient states he has been consistent with home exercise program  Feeling better compared to initial evaluation  Objective: See treatment diary below      Assessment: Tolerated treatment well  Significant improvement in passive right knee range of motion, now [facility]  Noted restriction at right 1st MTP improved with manual therapy  Reviewed new exercises to include into home program, given handout  Patient would benefit from continued PT      Plan: Continue per plan of care  Progress treatment as tolerated  Will call 3/8 to schedule f/u visits due to transportation  Precautions: TTWB in CAM boot  Progress note:   POC:     Manuals 3/1 3/5      PROM knee and ankle with active release  10 min      Right hamstring and calf stretch  3x:30 ea              Pt  Ed woundcare, WB precautions, use of CAM boot       Neuro Re-Ed                QS :05x10 :05x10      Glut set                                        Ther Ex        Ankle pumps 30x       Ankle ABC 1x       Hamstring stretch 3x:30       Heel slide flex, abd Flex 10x       SLR  *10x ^     SAQ  *10x ^     LAQ 10x       Ball squeeze        Clamshell        [place]   *reviewed      Towel scrunch  *2 min                                                      Ther Activity                                Gait Training        Mirror gait with vc                Modalities        CP                  Access Code: [email or web address]   URL: [email or web address]   Date: 03/05/2021   Prepared by: Dominique Herrera     Exercises  Ankle Pumps in Elevation - 10 reps - 3 sets - 3x daily - 7x weekly  Ankle Alphabet in Elevation - 10 reps - 3 sets - 3x daily - 7x weekly  Supine Heel Slide - 10 reps - 3 sets - 3x daily - 7x weekly  Long Sitting Quad Set - 10 reps - 1 sets - 5 sec hold - 3x daily - 7x weekly  Seated Long Arc Quad - 10 reps - 3 sets - 1x daily - 7x weekly  Seated Hamstring Stretch - 3 reps - 1 sets - 30 sec hold - 3x daily - 7x weekly  Active Straight Leg Raise with Quad Set - 10 reps - 3 sets - 1x daily - 7x weekly  Supine Knee Extension Strengthening - 10 reps - 3 sets - 1x daily - 7x weekly  Towel Scrunches - 1 reps - 1 sets - 2 min hold - 3x daily - 7x weekly  Seated [place] Pick-Up with Toes - 1 reps - 1 sets - 2 min hold - 1x daily - 7x weekly

## 2021-03-10 ENCOUNTER — TELEPHONE (OUTPATIENT)
Dept: OBGYN CLINIC | Facility: HOSPITAL | Age: 56
End: 2021-03-10

## 2021-03-10 NOTE — TELEPHONE ENCOUNTER
Patient has an appointment scheduled on Monday, 3/22 and he wants to know, if he needs a letter or some type or documentation to travel? He will be traveling to [place] on 3/26 and because he has metal in his body, he does not want to have any issues when traveling at the airport

## 2021-03-11 ENCOUNTER — OFFICE VISIT (OUTPATIENT)
Dept: PHYSICAL THERAPY | Facility: CLINIC | Age: 56
End: 2021-03-11
Payer: COMMERCIAL

## 2021-03-11 DIAGNOSIS — R26.9 GAIT ABNORMALITY: ICD-10-CM

## 2021-03-11 DIAGNOSIS — M25.571 RIGHT ANKLE PAIN, UNSPECIFIED CHRONICITY: ICD-10-CM

## 2021-03-11 DIAGNOSIS — S82.201S FRACTURE TIBIA/FIBULA, RIGHT, SEQUELA: Primary | ICD-10-CM

## 2021-03-11 DIAGNOSIS — S82.401S FRACTURE TIBIA/FIBULA, RIGHT, SEQUELA: Primary | ICD-10-CM

## 2021-03-11 DIAGNOSIS — M25.561 RIGHT KNEE PAIN, UNSPECIFIED CHRONICITY: ICD-10-CM

## 2021-03-11 PROCEDURE — 97140 MANUAL THERAPY 1/> REGIONS: CPT | Performed by: PHYSICAL THERAPIST

## 2021-03-11 PROCEDURE — 97110 THERAPEUTIC EXERCISES: CPT | Performed by: PHYSICAL THERAPIST

## 2021-03-11 NOTE — PROGRESS NOTES
Daily Note     Today's date: 3/11/2021  Patient name: Lana Real  : 1965  MRN: [id number]  Referring provider: Baron Duenas  Dx:   Encounter Diagnosis     ICD-10-CM    1  Fracture tibia/fibula, right, sequela  S82 201S     S82 401S    2  Gait abnormality  R26 9    3  Right ankle pain, unspecified chronicity  M25 571    4  Right knee pain, unspecified chronicity  M25 561        Start Time: 1315  Stop Time: 1345  Total time in clinic (min): 30 minutes    Subjective: Patient states ankle feels stiff, but thinks overall he is improving  No significant complaints at this time  Objective: See treatment diary below      Assessment: Tolerated treatment well  Showing gradual improvement in right knee flexion with measurement of 110 degrees today  Shown a variety of therapeutic exercises to continue with at home, would like to work mainly on manual work while at therapy  Patient would benefit from continued PT      Plan: Continue per plan of care  Progress treatment as tolerated         Precautions: TTWB in CAM boot  Progress note:   POC:     Manuals 3/1 3/5 3/11     PROM knee and ankle with active release  10 min 15 min     Right hamstring and calf stretch  3x:30 ea              Pt  Ed woundcare, WB precautions, use of CAM boot       Neuro Re-Ed                QS :05x10 :05x10 :05x10     Glut set                                        Ther Ex        Ankle pumps 30x  reviewed     Ankle ABC 1x  reviewed     Hamstring stretch 3x:30  reviewed     Heel slide flex, abd Flex 10x  Flex 20x     SLR  *10x ^3x10     SAQ  *10x ^3x10     LAQ 10x  3x10     Ball squeeze        Clamshell        [place]   *reviewed reviewed     Towel scrunch  *2 min reviewed     sidelying hip abd   2x10                                             Ther Activity                                Gait Training        Mirror gait with vc                Modalities        CP                  Access Code: [email or web address]   URL: [email or web address]   Date: 03/05/2021   Prepared by: Lauren Kennedy     Exercises  Ankle Pumps in Elevation - 10 reps - 3 sets - 3x daily - 7x weekly  Ankle Alphabet in Elevation - 10 reps - 3 sets - 3x daily - 7x weekly  Supine Heel Slide - 10 reps - 3 sets - 3x daily - 7x weekly  Long Sitting Quad Set - 10 reps - 1 sets - 5 sec hold - 3x daily - 7x weekly  Seated Long Arc Quad - 10 reps - 3 sets - 1x daily - 7x weekly  Seated Hamstring Stretch - 3 reps - 1 sets - 30 sec hold - 3x daily - 7x weekly  Active Straight Leg Raise with Quad Set - 10 reps - 3 sets - 1x daily - 7x weekly  Supine Knee Extension Strengthening - 10 reps - 3 sets - 1x daily - 7x weekly  Towel Scrunches - 1 reps - 1 sets - 2 min hold - 3x daily - 7x weekly  Seated [place] Pick-Up with Toes - 1 reps - 1 sets - 2 min hold - 1x daily - 7x weekly

## 2021-03-22 ENCOUNTER — OFFICE VISIT (OUTPATIENT)
Dept: OBGYN CLINIC | Facility: CLINIC | Age: 56
End: 2021-03-22

## 2021-03-22 VITALS
HEART RATE: 74 BPM | DIASTOLIC BLOOD PRESSURE: 97 MMHG | BODY MASS INDEX: 20.09 KG/M2 | WEIGHT: 125 LBS | HEIGHT: 66 IN | SYSTOLIC BLOOD PRESSURE: 168 MMHG

## 2021-03-22 DIAGNOSIS — S82.401D CLOSED FRACTURE OF RIGHT TIBIA AND FIBULA WITH ROUTINE HEALING, SUBSEQUENT ENCOUNTER: Primary | ICD-10-CM

## 2021-03-22 DIAGNOSIS — S82.201D CLOSED FRACTURE OF RIGHT TIBIA AND FIBULA WITH ROUTINE HEALING, SUBSEQUENT ENCOUNTER: Primary | ICD-10-CM

## 2021-03-22 PROCEDURE — 99024 POSTOP FOLLOW-UP VISIT: CPT | Performed by: ORTHOPAEDIC SURGERY

## 2021-03-22 RX ORDER — HYDROCODONE BITARTRATE AND ACETAMINOPHEN 5; 325 MG/1; MG/1
1 TABLET ORAL EVERY 6 HOURS PRN
Qty: 28 TABLET | Refills: 0 | Status: SHIPPED | OUTPATIENT
Start: 2021-03-22 | End: 2021-04-01

## 2021-03-22 NOTE — PROGRESS NOTES
ASSESSMENT/PLAN:    Diagnoses and all orders for this visit:    Closed fracture of right tibia and fibula with routine healing, subsequent encounter  -     XR tibia fibula 2 vw right; Future  -     HYDROcodone-acetaminophen (NORCO) 5-325 mg per tablet; Take 1 tablet by mouth every 6 (six) hours as needed for pain for up to 10 daysMax Daily Amount: 4 tablets       X-rays of the patient's right tibia show the IM tibia nail and hardware to be in good aignment  There is some callus formation starting  The fractures are healing in good alignment  The patient should continue to be nonweightbearing and use the cam walker boot  He will follow up with our office in 1 month with new x-rays of his right tib fib two views  He was also given a refill of Norco 28 tablets  The patient is acceptable to this plan  Return in about 1 month (around 4/22/2021)  _____________________________________________________  CHIEF COMPLAINT:  Chief Complaint   Patient presents with    Right Lower Leg - Post-op, Follow-up         SUBJECTIVE:  Jagdish Hobson is a 60 y o  male s/p  ORIF right tibia with insertion of intramedullary nail from 02/10/2021  The patient presents to our office for a postop visit  He denies any significant pain  He states he has been nonweightbearing  He is currently using crutches and a Cam walker boot  He denies any numbness or tingling  He denies any fever or chills  The following portions of the patient's history were reviewed and updated as appropriate: allergies, current medications, past family history, past medical history, past social history, past surgical history and problem list     PAST MEDICAL HISTORY:  History reviewed  No pertinent past medical history      PAST SURGICAL HISTORY:  Past Surgical History:   Procedure Laterality Date    NO PAST SURGERIES      TN TREAT TIBIAL SHAFT FX, INTRAMED IMPLANT Right 2/10/2021    Procedure: INSERTION NAIL IM TIBIA  WITH ORIF OF LATERAL MALLEOLI; Surgeon: Earle Miller DO;  Location: [facility] MAIN OR;  Service: Orthopedics       FAMILY HISTORY:  Family History   Problem Relation Age of Onset    Stroke Mother     No Known Problems Father        SOCIAL HISTORY:  Social History     Tobacco Use    Smoking status: Current Every Day Smoker     Packs/day: 0 25    Smokeless tobacco: Never Used    Tobacco comment: pt has cut down to about 4-5 cig/ day   Substance Use Topics    Alcohol use: Not Currently     Frequency: 2-4 times a month    Drug use: Yes     Types: Marijuana     Comment: occasional marijuana       MEDICATIONS:    Current Outpatient Medications:     aspirin (ECOTRIN) 325 mg EC tablet, Take 1 tablet (325 mg total) by mouth daily, Disp: 30 tablet, Rfl: 0    meloxicam (MOBIC) 15 mg tablet, Take 1 tablet (15 mg total) by mouth daily, Disp: 30 tablet, Rfl: 0    HYDROcodone-acetaminophen (NORCO) 5-325 mg per tablet, Take 1 tablet by mouth every 6 (six) hours as needed for pain for up to 10 daysMax Daily Amount: 4 tablets, Disp: 28 tablet, Rfl: 0    ALLERGIES:  No Known Allergies    ROS:  Review of Systems     Constitutional: Negative for fatigue, fever or loss of appetite  HENT: Negative  Respiratory: Negative for shortness of breath, dyspnea  Cardiovascular: Negative for chest pain/tightness  Gastrointestinal: Negative for abdominal pain, N/V  Endocrine: Negative for cold/heat intolerance, unexplained weight loss/gain  Genitourinary: Negative for flank pain, dysuria, hematuria  Musculoskeletal: Positive for arthralgia   Skin: Negative for rash  Neurological: Negative for numbness or tingling  Psychiatric/Behavioral: Negative for agitation  _____________________________________________________  PHYSICAL EXAMINATION:    Blood pressure 168/97, pulse 74, height 5' 6" (1 676 m), weight 56 7 kg (125 lb)  Constitutional: Oriented to person, place, and time  Appears well-developed and well-nourished  No distress     HENT:   Head: Normocephalic  Eyes: Conjunctivae are normal  Right eye exhibits no discharge  Left eye exhibits no discharge  No scleral icterus  Cardiovascular: Normal rate  Pulmonary/Chest: Effort normal    Neurological: Alert and oriented to person, place, and time  Skin: Skin is warm and dry  No rash noted  Not diaphoretic  No erythema  No pallor  Psychiatric: Normal mood and affect  Behavior is normal  Judgment and thought content normal       MUSCULOSKELETAL EXAMINATION:   Physical Exam  Ortho Exam      Right lower extremity neurovascularly intact   Toes are pink and mobile   Compartments are soft   Extensor mechanism is intact   Brisk cap refill   Incisions are healing well   negative Homans   Sensation intact    Objective:  BP Readings from Last 1 Encounters:   03/22/21 168/97      Wt Readings from Last 1 Encounters:   03/22/21 56 7 kg (125 lb)        BMI:   Estimated body mass index is 20 18 kg/m² as calculated from the following:    Height as of this encounter: 5' 6" (1 676 m)  Weight as of this encounter: 56 7 kg (125 lb)  Radiographs:  _____________________________________________________  STUDIES REVIEWED:  I have personally reviewed pertinent films and reports in PACS  X-rays of the patient's right tibia show the IM tibia nail and hardware to be in good aignment  There is some callus formation starting  The fractures are healing in good alignment        Reggie Chavira PA-C

## 2021-03-22 NOTE — LETTER
March 22, 2021     Patient: Eddie Scott   YOB: 1965   Date of Visit: 3/22/2021       To Whom it May Concern:    Silverio Calvo is under my professional care  He was seen in my office on 3/22/2021  He underwent an ORIF of his right tibia with an insertion of an intramedullary nail on 2/10/2021  If you have any questions or concerns, please don't hesitate to call  Sincerely,          Shlomo Ascencio DO        CC: Christine Trent

## 2021-04-21 ENCOUNTER — TELEPHONE (OUTPATIENT)
Dept: OBGYN CLINIC | Facility: OTHER | Age: 56
End: 2021-04-21

## 2021-04-21 NOTE — TELEPHONE ENCOUNTER
Patient wanted to ask you if he is able to drive? The operation was on his right leg, can he drive yet?     C/b # [phone] , can leave a message

## 2021-04-22 NOTE — PROGRESS NOTES
PT Discharge    Today's date: 2021  Patient name: Agustina Davila  : 1965  MRN: [id number]  Referring provider: Layne Bashir  Dx:   Encounter Diagnosis     ICD-10-CM    1  Fracture tibia/fibula, right, sequela  S82 201S     S82 401S    2  Gait abnormality  R26 9    3  Right ankle pain, unspecified chronicity  M25 571    4  Right knee pain, unspecified chronicity  M25 561      Assessment  Assessment details: Agustina Davila was seen in outpatient PT for 3 sessions beginning 3/1/21  Patient is a 60 y o  male with diagnosis of right tib fib fracture  Patient has not been seen for >1 month with no f/u sessions scheduled  Is to be discharged from care at this time  Please see below initial evaluation for most current objective findings  Impairments: abnormal gait, abnormal muscle firing, abnormal muscle tone, abnormal or restricted ROM, abnormal movement, activity intolerance, impaired balance, impaired physical strength, lacks appropriate home exercise program, pain with function, safety issue and weight-bearing intolerance    Goals- Not met due to self DC  STG (6 weeks)  1  Patient will have reported 0/10 pain in right LE at rest    2  Improve patient's right knee to 120 degrees for increased ability to take proper strides during ambulation  3  Increase patient's right single leg balance to 2 seconds for increased stability on stairs  LTG (12 weeks)  1  Patient's LE strength will be equal bilaterally for ability to ambulate and return to functional activities at PLOF  2  Patient will be able to walk in community and return to work with 0/10 pain in right LE  3  Patient will be independent with home exercise program for continued maintenance post PT discharge         Plan  Plan details:Self DC PT  Plan of Care beginning date: 3/1/2021          Subjective Evaluation    History of Present Illness  Date of onset: 2021  Date of surgery: 2/10/2021  Mechanism of injury: Agustina Davila is a 60 y o  male who presents to outpatient Physical Therapy today with complaints of right low leg pain  Dianna Thomas when slipping on ice at the beginning of 2021  States right leg went sideways and had immediate pain  Taken by ambulance to ED Dx with right tib and fib fx  Surgery 1 week later with most recent f/u 1 week ago where staples were removed  Patient instructed to wear CAM boot when moving around home and at night, TTWB  Pain  Current pain ratin  At best pain ratin  At worst pain ratin  Location: anterior low leg, proximal and distally  Quality: sharp  Relieving factors: relaxation  Progression: improved          Objective     Observations     Additional Observation Details  2 incisions at ankle, 2 at medial knee covered in steri strips, healing well no redness or drainage  Min-trace edema  Tenderness     Additional Tenderness Details  TTP generalized to right ankle no pain at knee or low leg    Neurological Testing     Sensation     Knee   Left Knee   Intact: light touch    Right Knee   Diminished: light touch     Comments   Right light touch: at low leg       Active Range of Motion   Left Knee   Flexion: 140 degrees   Extension: 8 degrees     Right Knee   Flexion: 105 degrees with pain  Extension: 2 degrees with pain  Left Ankle/Foot   Dorsiflexion (ke): 15 degrees   Plantar flexion: 50 degrees   Inversion: 38 degrees   Eversion: 20 degrees     Right Ankle/Foot   Dorsiflexion (ke): -18 degrees   Plantar flexion: 45 degrees   Inversion: 15 degrees   Eversion: 0 degrees     Strength/Myotome Testing     Left Knee   Normal strength    Right Knee   Quadriceps contraction: fair    Left Ankle/Foot   Normal strength    Additional Strength Details  Right knee strength NT at intake  Right ankle strength NT at intake    General Comments:      Knee Comments  Gait: 2 axillary crutches, NWB, no CAM boot    FOTO: 29% function, 60% predicted function

## 2021-04-26 ENCOUNTER — OFFICE VISIT (OUTPATIENT)
Dept: OBGYN CLINIC | Facility: CLINIC | Age: 56
End: 2021-04-26

## 2021-04-26 VITALS
WEIGHT: 125 LBS | BODY MASS INDEX: 20.09 KG/M2 | HEART RATE: 78 BPM | HEIGHT: 66 IN | DIASTOLIC BLOOD PRESSURE: 89 MMHG | SYSTOLIC BLOOD PRESSURE: 136 MMHG

## 2021-04-26 DIAGNOSIS — S82.201D CLOSED FRACTURE OF RIGHT TIBIA AND FIBULA WITH ROUTINE HEALING, SUBSEQUENT ENCOUNTER: Primary | ICD-10-CM

## 2021-04-26 DIAGNOSIS — S82.401D CLOSED FRACTURE OF RIGHT TIBIA AND FIBULA WITH ROUTINE HEALING, SUBSEQUENT ENCOUNTER: Primary | ICD-10-CM

## 2021-04-26 PROCEDURE — 99024 POSTOP FOLLOW-UP VISIT: CPT | Performed by: ORTHOPAEDIC SURGERY

## 2021-04-26 NOTE — PROGRESS NOTES
ASSESSMENT/PLAN:    Diagnoses and all orders for this visit:    Closed fracture of right tibia and fibula with routine healing, subsequent encounter  -     XR tibia fibula 2 vw right; Future        X-rays the patient's right tibia show the IM tibial nail as well as the hardware to be in good alignment  The fractures are continuing to heal   There is callus formation  The fractures are healing in good alignment  The patient is now cleared for 50% weight-bearing and should continue to use the cam walker boot  He will follow up with our office in 1 month with new x-rays of the right tib fib two views  The patient is acceptable to this plan  Return in about 4 weeks (around 5/24/2021)  _____________________________________________________  CHIEF COMPLAINT:  Chief Complaint   Patient presents with    Right Ankle - Post-op, Follow-up         SUBJECTIVE:  vEa Avery is a 60 y o  male   Status post ORIF right tibia as well as fibula fracture from 02/10/2021  The patient has been nonweightbearing and has been using a Cam walker boot  He denies any significant pain at this time  His incisions are well healed  He denies any numbness or tingling  He denies any fever or chills  The following portions of the patient's history were reviewed and updated as appropriate: allergies, current medications, past family history, past medical history, past social history, past surgical history and problem list     PAST MEDICAL HISTORY:  History reviewed  No pertinent past medical history      PAST SURGICAL HISTORY:  Past Surgical History:   Procedure Laterality Date    NO PAST SURGERIES      [place] TREAT TIBIAL SHAFT FX, INTRAMED IMPLANT Right 2/10/2021    Procedure: INSERTION NAIL IM TIBIA  WITH ORIF OF LATERAL MALLEOLI;  Surgeon: Loretta Gtz DO;  Location: [address] [place] [address] OR;  Service: Orthopedics       FAMILY HISTORY:  Family History   Problem Relation Age of Onset    Stroke Mother     No Known Problems Father SOCIAL HISTORY:  Social History     Tobacco Use    Smoking status: Current Every Day Smoker     Packs/day: 0 25    Smokeless tobacco: Never Used    Tobacco comment: pt has cut down to about 4-5 cig/ day   Substance Use Topics    Alcohol use: Not Currently     Frequency: 2-4 times a month    Drug use: Yes     Types: Marijuana     Comment: occasional marijuana       MEDICATIONS:    Current Outpatient Medications:     aspirin (ECOTRIN) 325 mg EC tablet, Take 1 tablet (325 mg total) by mouth daily (Patient not taking: Reported on 4/26/2021), Disp: 30 tablet, Rfl: 0    meloxicam (MOBIC) 15 mg tablet, Take 1 tablet (15 mg total) by mouth daily (Patient not taking: Reported on 4/26/2021), Disp: 30 tablet, Rfl: 0    ALLERGIES:  No Known Allergies    ROS:  Review of Systems     Constitutional: Negative for fatigue, fever or loss of appetite  HENT: Negative  Respiratory: Negative for shortness of breath, dyspnea  Cardiovascular: Negative for chest pain/tightness  Gastrointestinal: Negative for abdominal pain, N/V  Endocrine: Negative for cold/heat intolerance, unexplained weight loss/gain  Genitourinary: Negative for flank pain, dysuria, hematuria  Musculoskeletal: Negative for arthralgia   Skin: Negative for rash  Neurological: Negative for numbness or tingling  Psychiatric/Behavioral: Negative for agitation  _____________________________________________________  PHYSICAL EXAMINATION:    Blood pressure 136/89, pulse 78, height 5' 6" (1 676 m), weight 56 7 kg (125 lb)  Constitutional: Oriented to person, place, and time  Appears well-developed and well-nourished  No distress  HENT:   Head: Normocephalic  Eyes: Conjunctivae are normal  Right eye exhibits no discharge  Left eye exhibits no discharge  No scleral icterus  Cardiovascular: Normal rate  Pulmonary/Chest: Effort normal    Neurological: Alert and oriented to person, place, and time  Skin: Skin is warm and dry   No rash noted  Not diaphoretic  No erythema  No pallor  Psychiatric: Normal mood and affect  Behavior is normal  Judgment and thought content normal       MUSCULOSKELETAL EXAMINATION:   Physical Exam  Ortho Exam      Right lower extremity is neurovascularly intact   Toes are pink and mobile   Compartments are soft   No warmth, erythema or ecchymosis present   No significant edema present  Incisions are well healed   Brisk cap refill   Sensation intact   Mild tenderness to palpation of fracture sites     Objective:  BP Readings from Last 1 Encounters:   04/26/21 136/89      Wt Readings from Last 1 Encounters:   04/26/21 56 7 kg (125 lb)        BMI:   Estimated body mass index is 20 18 kg/m² as calculated from the following:    Height as of this encounter: 5' 6" (1 676 m)  Weight as of this encounter: 56 7 kg (125 lb)  Radiographs:  _____________________________________________________  STUDIES REVIEWED:  I have personally reviewed pertinent films and reports in PACS  X-rays the patient's right tibia show the IM tibial nail as well as the hardware to be in good alignment  The fractures are continuing to heal   There is callus formation  The fractures are healing in good alignment        Marline Basurto PA-C

## 2021-05-05 ENCOUNTER — OFFICE VISIT (OUTPATIENT)
Dept: PHYSICAL THERAPY | Facility: CLINIC | Age: 56
End: 2021-05-05
Payer: COMMERCIAL

## 2021-05-05 DIAGNOSIS — M25.561 RIGHT KNEE PAIN, UNSPECIFIED CHRONICITY: ICD-10-CM

## 2021-05-05 DIAGNOSIS — M25.571 RIGHT ANKLE PAIN, UNSPECIFIED CHRONICITY: ICD-10-CM

## 2021-05-05 DIAGNOSIS — R26.9 GAIT ABNORMALITY: Primary | ICD-10-CM

## 2021-05-05 PROCEDURE — 97116 GAIT TRAINING THERAPY: CPT | Performed by: PHYSICAL THERAPIST

## 2021-05-05 PROCEDURE — 97530 THERAPEUTIC ACTIVITIES: CPT | Performed by: PHYSICAL THERAPIST

## 2021-05-05 PROCEDURE — 97162 PT EVAL MOD COMPLEX 30 MIN: CPT | Performed by: PHYSICAL THERAPIST

## 2021-05-05 NOTE — PROGRESS NOTES
PT Evaluation     Today's date: 2021  Patient name: Ez Easley  : 1965  MRN: [id number]  Referring provider: Alex Sánchez, PT  Dx:   Encounter Diagnosis     ICD-10-CM    1  Gait abnormality  R26 9    2  Right ankle pain, unspecified chronicity  M25 571    3  Right knee pain, unspecified chronicity  M25 561        Start Time: 1400  Stop Time: 1445  Total time in clinic (min): 45 minutes    Assessment  Assessment details: Ez Easley was seen for an initial PT evaluation today  Patient is a 60 y o  male with diagnosis of right tib fib fracture  Moderate complexity evaluation  due to number of participation restrictions, functional outcome measure of 59% limitation, and changing clinical presentation  Findings today show limitation in right knee and ankle strength, ankle range of motion, decreased stability, pain, and weightbearing restriction consistent with status post diagnosis  Skilled PT indicated to treat at this time to address range of motion and muscle activation progressing to strengthening per protocol to return patient to PLOF  Impairments: abnormal gait, abnormal muscle firing, abnormal muscle tone, abnormal or restricted ROM, abnormal movement, activity intolerance, impaired balance, impaired physical strength, lacks appropriate home exercise program, pain with function, safety issue and weight-bearing intolerance    Goals  STG (6 weeks)  1  Patient will have reported 0/10 pain in right LE at rest    2  Improve patient's right ankle DF to 10 degrees for increased ability to take proper strides during ambulation  3  Increase patient's right single leg balance to 2 seconds for increased stability on stairs  LTG (12 weeks)  1  Patient's LE strength will be equal bilaterally for ability to ambulate and return to functional activities at PLOF  2  Patient will be able to walk in community and return to work with 0/10 pain in right LE     3  Patient will be independent with home exercise program for continued maintenance post PT discharge  Plan  Plan details: Progress note in 4 weeks  Patient would benefit from: skilled physical therapy  Planned modality interventions: unattended electrical stimulation, thermotherapy: hydrocollator packs and cryotherapy  Planned therapy interventions: manual therapy, neuromuscular re-education, self care, home exercise program, gait training, therapeutic exercise and therapeutic activities  Frequency: 2-3x/week  Duration in weeks: 12  Plan of Care beginning date: 2021  Plan of Care expiration date: 2021  Treatment plan discussed with: patient and PTA        Subjective Evaluation    History of Present Illness  Date of onset: 2021  Date of surgery: 2/10/2021  Mechanism of injury: Bill Galvez is a 60 y o  male who presents to outpatient Physical Therapy today with complaints of right low leg pain  Cooper Apple when slipping on ice at the beginning of 2021  States right leg went sideways and had immediate pain  Taken by ambulance to ED Dx with right tib and fib fx  Surgery 1 week later  Had been seen in outpatient PT, but was away for several weeks and was DC  Returning to PT at this time after f/u with DO last week who decreased restrictions to 50% WB  Pain  Current pain ratin  At best pain ratin  At worst pain ratin  Location: anterior low leg, proximal and distally  Quality: sharp  Relieving factors: relaxation  Progression: improved    Social Support  Steps to enter house: yes  1  Stairs in house: yes (bedroom and bathroom 2nd floor)   Lives in: multiple-level home  Lives with: alone    Employment status: not working ([employer]; off work)  Patient Goals  Patient goals for therapy: decreased pain  Patient goal: Increase activity, learn to walk with restricitons        Objective     Observations     Additional Observation Details  2 incisions at ankle, 2 at medial knee healed, scarred  Min-trace edema       Tenderness Additional Tenderness Details  No significant TTP    Neurological Testing     Sensation     Knee   Left Knee   Intact: light touch    Right Knee   Intact: light touch     Active Range of Motion   Left Knee   Flexion: 140 degrees   Extension: 8 degrees     Right Knee   Flexion: 105 degrees with pain  Extension: 2 degrees with pain  Left Ankle/Foot   Dorsiflexion (ke): 15 degrees   Plantar flexion: 50 degrees   Inversion: 38 degrees   Eversion: 20 degrees     Right Ankle/Foot   Dorsiflexion (ke): -10 degrees   Plantar flexion: 50 degrees   Inversion: 15 degrees   Eversion: 5 degrees     Strength/Myotome Testing     Left Knee   Normal strength    Right Knee   Flexion: 4  Extension: 4  Quadriceps contraction: fair    Left Ankle/Foot   Normal strength    Right Ankle/Foot   Dorsiflexion: 2  Plantar flexion: 3  Inversion: 3  Eversion: 3    General Comments:      Knee Comments  Gait: 2 axillary crutches, NWB, CAM boot    FOTO: 41% function, 63% predicted function                Precautions: 50% in CAM boot  Progress note: 6/5  POC: 8/5    Manuals 5/5       PROM knee and ankle                        Pt  Ed        Neuro Re-Ed                QS        Glut set                                        Ther Ex        Ankle pumps        Ankle ABC        Hamstring stretch        Heel slide flex, abd        SLR        SAQ        LAQ        Ball squeeze        [place]        [place]         Towel scrunch                                                        Ther Activity        Stair climbing 10 min                       Gait Training        Mirror gait with vc                Weight shifting ed 15 min       Modalities        CP

## 2021-05-19 ENCOUNTER — OFFICE VISIT (OUTPATIENT)
Dept: PHYSICAL THERAPY | Facility: CLINIC | Age: 56
End: 2021-05-19
Payer: COMMERCIAL

## 2021-05-19 DIAGNOSIS — R26.9 GAIT ABNORMALITY: Primary | ICD-10-CM

## 2021-05-19 DIAGNOSIS — M25.571 RIGHT ANKLE PAIN, UNSPECIFIED CHRONICITY: ICD-10-CM

## 2021-05-19 DIAGNOSIS — M25.561 RIGHT KNEE PAIN, UNSPECIFIED CHRONICITY: ICD-10-CM

## 2021-05-19 PROCEDURE — 97110 THERAPEUTIC EXERCISES: CPT

## 2021-05-19 PROCEDURE — 97112 NEUROMUSCULAR REEDUCATION: CPT

## 2021-05-19 NOTE — PROGRESS NOTES
Daily Note     Today's date: 2021  Patient name: Eli Sánchez  : 1965  MRN: [id number]  Referring provider: Marygrace Boast, PT  Dx:   Encounter Diagnosis     ICD-10-CM    1  Gait abnormality  R26 9    2  Right ankle pain, unspecified chronicity  M25 571    3  Right knee pain, unspecified chronicity  M25 561                   Subjective: Patient states he has no pain in the ankle today  He reports he has not been wearing his boot in home  Encouraged boot use as prescribed by MD for safety of patient and proper healing  Patient was late for appointment  He forgot the time and came late after he was called about missing his time  Objective: See treatment diary below    Patient's home exercise program updated to include additional exercises  Handout issued and explained with demonstration  Patient accepts new exercises  Red theraband issued for home use  Reviewed proper use of single crutch  Assessment: Tolerated treatment well  Patient would benefit from continued PT for stretching and strengthening  Patient was able to add exercises to his program without difficulty or discomfort  Patient seemed to understand all education given on gait and new exercises  Patient felt ok when he left department  Plan: Continue per plan of care  Progress treatment as tolerated         Precautions: 50% in CAM boot  Progress note:   POC:     Manuals       PROM knee and ankle                        Pt  Ed        Neuro Re-Ed                QS  *:05x10 with towel      Glut set  *:05x10                                      Ther Ex        Ankle pumps  :05x10      Ankle ABC  *1x      Hamstring stretch        Heel slide flex, abd  *05x10 ea      SLR        SAQ        LAQ  *:03x10      Ball squeeze  *:05x10      Clamshell  *:05x10 red supine      [place]         Towel scrunch                                                        Ther Activity        Stair climbing 10 min Gait Training        Mirror gait with vc                Weight shifting ed 15 min       Modalities        CP

## 2021-05-24 ENCOUNTER — OFFICE VISIT (OUTPATIENT)
Dept: OBGYN CLINIC | Facility: CLINIC | Age: 56
End: 2021-05-24
Payer: COMMERCIAL

## 2021-05-24 ENCOUNTER — OFFICE VISIT (OUTPATIENT)
Dept: PHYSICAL THERAPY | Facility: CLINIC | Age: 56
End: 2021-05-24
Payer: COMMERCIAL

## 2021-05-24 VITALS
DIASTOLIC BLOOD PRESSURE: 72 MMHG | HEART RATE: 64 BPM | BODY MASS INDEX: 20.09 KG/M2 | WEIGHT: 125 LBS | HEIGHT: 66 IN | SYSTOLIC BLOOD PRESSURE: 124 MMHG

## 2021-05-24 DIAGNOSIS — Z87.81 STATUS POST OPEN REDUCTION AND INTERNAL FIXATION (ORIF) OF FRACTURE: ICD-10-CM

## 2021-05-24 DIAGNOSIS — S82.401D CLOSED FRACTURE OF RIGHT TIBIA AND FIBULA WITH ROUTINE HEALING, SUBSEQUENT ENCOUNTER: Primary | ICD-10-CM

## 2021-05-24 DIAGNOSIS — Z98.890 STATUS POST OPEN REDUCTION AND INTERNAL FIXATION (ORIF) OF FRACTURE: ICD-10-CM

## 2021-05-24 DIAGNOSIS — R26.9 GAIT ABNORMALITY: Primary | ICD-10-CM

## 2021-05-24 DIAGNOSIS — M25.561 RIGHT KNEE PAIN, UNSPECIFIED CHRONICITY: ICD-10-CM

## 2021-05-24 DIAGNOSIS — M25.571 RIGHT ANKLE PAIN, UNSPECIFIED CHRONICITY: ICD-10-CM

## 2021-05-24 DIAGNOSIS — S82.201D CLOSED FRACTURE OF RIGHT TIBIA AND FIBULA WITH ROUTINE HEALING, SUBSEQUENT ENCOUNTER: Primary | ICD-10-CM

## 2021-05-24 PROCEDURE — 97116 GAIT TRAINING THERAPY: CPT | Performed by: PHYSICAL THERAPIST

## 2021-05-24 PROCEDURE — 99213 OFFICE O/P EST LOW 20 MIN: CPT | Performed by: ORTHOPAEDIC SURGERY

## 2021-05-24 PROCEDURE — 97110 THERAPEUTIC EXERCISES: CPT | Performed by: PHYSICAL THERAPIST

## 2021-05-24 PROCEDURE — 97112 NEUROMUSCULAR REEDUCATION: CPT | Performed by: PHYSICAL THERAPIST

## 2021-05-24 NOTE — PROGRESS NOTES
Daily Note     Today's date: 2021  Patient name: Stacie Vicente  : 1965  MRN: [id number]  Referring provider: Jennifer Pringle DO  Dx:   Encounter Diagnosis     ICD-10-CM    1  Gait abnormality  R26 9    2  Right ankle pain, unspecified chronicity  M25 571    3  Right knee pain, unspecified chronicity  M25 561        Start Time: 07  Stop Time: 0815  Total time in clinic (min): 45 minutes    Subjective: Patient states minimal pain in ankle, no knee pain  To f/u with DO later today  Ambulating with 1 axillary crutch  Objective: See treatment diary below      Assessment: Tolerated treatment well  Altered gait mechanics with lean to left onto crutch and right LE circumduction  Crutch was adjusted with slight improvement in gait post session  Patient would benefit from continued PT      Plan: Continue per plan of care  Progress treatment as tolerated         Precautions: 50% in CAM boot  Progress note:   POC:     Manuals      PROM knee and ankle                        Pt  Ed        Neuro Re-Ed        Nustep   L1 S=8, A=8 10 min     QS  *:05x10 with towel 10x     Glut set  *:05x10 10x                                     Ther Ex        Ankle pumps  :05x10 30x     Ankle ABC  *1x 1x     Hamstring stretch        Heel slide flex, abd  *05x10 ea 30x ea     SLR        SAQ        LAQ  *:03x10 30x     Ball squeeze  *:05x10      Clamshell  *:05x10 red supine      [place]         Towel scrunch        Calf stretch    towel                                            Ther Activity        Stair climbing 10 min  reviewed                     Gait Training        Mirror gait with vc   10 min             Weight shifting ed 15 min       Modalities        CP

## 2021-05-24 NOTE — LETTER
May 24, 2021     Patient: Sabina Singh   YOB: 1965   Date of Visit: 5/24/2021       To Whom it May Concern:    Citlalli Dodd is under my professional care  He was seen in my office on 5/24/2021  He may return to work on 5/25/21 mostly sitting only  He may stand for 1 hour only every 8 hours  If you have any questions or concerns, please don't hesitate to call           Sincerely,          Mingo Patterson DO        CC: No Recipients

## 2021-05-24 NOTE — PROGRESS NOTES
ASSESSMENT/PLAN:    Diagnoses and all orders for this visit:    Closed fracture of right tibia and fibula with routine healing, subsequent encounter  -     XR tibia fibula 2 vw right; Future    Status post open reduction and internal fixation (ORIF) of fracture          X-rays of the patient's right tibia and fibula show the fractures to be almost completely healed  There is good callus formation  The hardware is in good position  There are no dislocations  The patient is now able to weight bear as tolerated with a Cam walker boot  He will follow up with our office in 1 month with new x-rays of his right tibia and fibula two views  The patient is acceptable to this plan  Return in about 1 month (around 6/24/2021)  _____________________________________________________  CHIEF COMPLAINT:  No chief complaint on file  SUBJECTIVE:  Oc Ho is a 60 y o  male   Status post ORIF right tibia as well as a fibula fracture from 02/10/2021  The patient was 50% weight-bearing while wearing the boot  He denies any significant pain today  He denies any significant edema  He denies any numbness or tingling  He denies any fever or chills  His incisions are well healed  The following portions of the patient's history were reviewed and updated as appropriate: allergies, current medications, past family history, past medical history, past social history, past surgical history and problem list     PAST MEDICAL HISTORY:  History reviewed  No pertinent past medical history      PAST SURGICAL HISTORY:  Past Surgical History:   Procedure Laterality Date    NO PAST SURGERIES      [place] TREAT TIBIAL SHAFT FX, INTRAMED IMPLANT Right 2/10/2021    Procedure: INSERTION NAIL IM TIBIA  WITH ORIF OF LATERAL MALLEOLI;  Surgeon: Jamin Joyner DO;  Location: [address] [place] [address];  Service: Orthopedics       FAMILY HISTORY:  Family History   Problem Relation Age of Onset    Stroke Mother     No Known Problems Father SOCIAL HISTORY:  Social History     Tobacco Use    Smoking status: Current Every Day Smoker     Packs/day: 0 25    Smokeless tobacco: Never Used    Tobacco comment: pt has cut down to about 4-5 cig/ day   Substance Use Topics    Alcohol use: Not Currently     Frequency: 2-4 times a month    Drug use: Yes     Types: Marijuana     Comment: occasional marijuana       MEDICATIONS:    Current Outpatient Medications:     aspirin (ECOTRIN) 325 mg EC tablet, Take 1 tablet (325 mg total) by mouth daily (Patient not taking: Reported on 5/24/2021), Disp: 30 tablet, Rfl: 0    meloxicam (MOBIC) 15 mg tablet, Take 1 tablet (15 mg total) by mouth daily (Patient not taking: Reported on 5/24/2021), Disp: 30 tablet, Rfl: 0    ALLERGIES:  No Known Allergies    ROS:  Review of Systems     Constitutional: Negative for fatigue, fever or loss of appetite  HENT: Negative  Respiratory: Negative for shortness of breath, dyspnea  Cardiovascular: Negative for chest pain/tightness  Gastrointestinal: Negative for abdominal pain, N/V  Endocrine: Negative for cold/heat intolerance, unexplained weight loss/gain  Genitourinary: Negative for flank pain, dysuria, hematuria  Musculoskeletal: Positive for arthralgia   Skin: Negative for rash  Neurological: Negative for numbness or tingling  Psychiatric/Behavioral: Negative for agitation  _____________________________________________________  PHYSICAL EXAMINATION:    Blood pressure 124/72, pulse 64, height 5' 6" (1 676 m), weight 56 7 kg (125 lb)  Constitutional: Oriented to person, place, and time  Appears well-developed and well-nourished  No distress  HENT:   Head: Normocephalic  Eyes: Conjunctivae are normal  Right eye exhibits no discharge  Left eye exhibits no discharge  No scleral icterus  Cardiovascular: Normal rate  Pulmonary/Chest: Effort normal    Neurological: Alert and oriented to person, place, and time  Skin: Skin is warm and dry   No rash noted  Not diaphoretic  No erythema  No pallor  Psychiatric: Normal mood and affect  Behavior is normal  Judgment and thought content normal       MUSCULOSKELETAL EXAMINATION:   Physical Exam  Ortho Exam      Right lower extremity is neurovascularly intact   Toes are pink mobile   Compartments are soft   No tenderness to palpation of previous fracture sites   Incisions are well healed   Brisk cap refill   Sensation intact  No warmth, erythema or ecchymosis   good dorsiflexion and plantar flexion of ankle  Objective:  BP Readings from Last 1 Encounters:   05/24/21 124/72      Wt Readings from Last 1 Encounters:   05/24/21 56 7 kg (125 lb)        BMI:   Estimated body mass index is 20 18 kg/m² as calculated from the following:    Height as of this encounter: 5' 6" (1 676 m)  Weight as of this encounter: 56 7 kg (125 lb)  Radiographs:  _____________________________________________________  STUDIES REVIEWED:  I have personally reviewed pertinent films and reports in PACS  X-rays of the patient's right tibia and fibula show the fractures to be almost completely healed  There is good callus formation  The hardware is in good position  There are no dislocations           Octavio Ricci PA-C

## 2021-06-28 VITALS
DIASTOLIC BLOOD PRESSURE: 71 MMHG | BODY MASS INDEX: 20.09 KG/M2 | HEART RATE: 60 BPM | WEIGHT: 125 LBS | SYSTOLIC BLOOD PRESSURE: 108 MMHG | HEIGHT: 66 IN

## 2021-06-28 DIAGNOSIS — S82.201D CLOSED FRACTURE OF RIGHT TIBIA AND FIBULA WITH ROUTINE HEALING, SUBSEQUENT ENCOUNTER: Primary | ICD-10-CM

## 2021-06-28 DIAGNOSIS — S82.401D CLOSED FRACTURE OF RIGHT TIBIA AND FIBULA WITH ROUTINE HEALING, SUBSEQUENT ENCOUNTER: Primary | ICD-10-CM

## 2021-06-28 PROCEDURE — 99213 OFFICE O/P EST LOW 20 MIN: CPT | Performed by: ORTHOPAEDIC SURGERY

## 2021-06-28 NOTE — PROGRESS NOTES
ASSESSMENT/PLAN:    Diagnoses and all orders for this visit:    Closed fracture of right tibia and fibula with routine healing, subsequent encounter  -     XR tibia fibula 2 vw right; Future         x-rays the patient's right tibia and fibula show the fractures to be completely healed  There is excellent callus formation  The fractures have healed in good alignment  The hardware is in good position and there are no signs of loosening  There are no dislocations  The patient is now cleared to weight bear as tolerated without the cam walker boot  He should continue a home exercise program for strengthening, stretching and range of motion  He will follow up with our office in 3 months with new x-rays of his right tibia and fibula two views  The patient is acceptable to this plan  Return in about 3 months (around 9/28/2021)  _____________________________________________________  CHIEF COMPLAINT:  Chief Complaint   Patient presents with    Right Lower Leg - Fracture         SUBJECTIVE:  Nena Bernabe is a 60 y o  male   Status post ORIF right tibia as well as fibula fracture from 02/10/2021  The patient presents to our office today for a follow-up visit  Last visit, he was allowed to weightbear as tolerated with a Cam walker boot  He denies any numbness or tingling  He denies any fever or chills  He denies any significant pain  He states he has been participating in a home exercise program for strengthening, stretching and range of motion  The following portions of the patient's history were reviewed and updated as appropriate: allergies, current medications, past family history, past medical history, past social history, past surgical history and problem list     PAST MEDICAL HISTORY:  History reviewed  No pertinent past medical history      PAST SURGICAL HISTORY:  Past Surgical History:   Procedure Laterality Date    NO PAST SURGERIES      RI TREAT TIBIAL SHAFT FX, INTRAMED IMPLANT Right 2/10/2021    Procedure: INSERTION NAIL IM TIBIA  WITH ORIF OF LATERAL MALLEOLI;  Surgeon: Gerri Knight DO;  Location: [address] [place] [address] MAIN OR;  Service: Orthopedics       FAMILY HISTORY:  Family History   Problem Relation Age of Onset    Stroke Mother     No Known Problems Father        SOCIAL HISTORY:  Social History     Tobacco Use    Smoking status: Current Every Day Smoker     Packs/day: 0 25    Smokeless tobacco: Never Used    Tobacco comment: pt has cut down to about 4-5 cig/ day   Vaping Use    Vaping Use: Never used   Substance Use Topics    Alcohol use: Not Currently    Drug use: Yes     Types: Marijuana     Comment: occasional marijuana       MEDICATIONS:    Current Outpatient Medications:     aspirin (ECOTRIN) 325 mg EC tablet, Take 1 tablet (325 mg total) by mouth daily (Patient not taking: Reported on 5/24/2021), Disp: 30 tablet, Rfl: 0    meloxicam (MOBIC) 15 mg tablet, Take 1 tablet (15 mg total) by mouth daily (Patient not taking: Reported on 5/24/2021), Disp: 30 tablet, Rfl: 0    ALLERGIES:  No Known Allergies    ROS:  Review of Systems     Constitutional: Negative for fatigue, fever or loss of appetite  HENT: Negative  Respiratory: Negative for shortness of breath, dyspnea  Cardiovascular: Negative for chest pain/tightness  Gastrointestinal: Negative for abdominal pain, N/V  Endocrine: Negative for cold/heat intolerance, unexplained weight loss/gain  Genitourinary: Negative for flank pain, dysuria, hematuria  Musculoskeletal:  Negative for arthralgia   Skin: Negative for rash  Neurological: Negative for numbness or tingling  Psychiatric/Behavioral: Negative for agitation  _____________________________________________________  PHYSICAL EXAMINATION:    Blood pressure 108/71, pulse 60, height 5' 6" (1 676 m), weight 56 7 kg (125 lb)  Constitutional: Oriented to person, place, and time  Appears well-developed and well-nourished  No distress  HENT:   Head: Normocephalic     Eyes: Conjunctivae are normal  Right eye exhibits no discharge  Left eye exhibits no discharge  No scleral icterus  Cardiovascular: Normal rate  Pulmonary/Chest: Effort normal    Neurological: Alert and oriented to person, place, and time  Skin: Skin is warm and dry  No rash noted  Not diaphoretic  No erythema  No pallor  Psychiatric: Normal mood and affect  Behavior is normal  Judgment and thought content normal       MUSCULOSKELETAL EXAMINATION:   Physical Exam  Ortho Exam      Right lower extremity is neurovascularly intact   Toes are pink and mobile   Compartments are soft   Incisions are well healed   No tenderness to palpation of previous fracture sites   Improved dorsiflexion and plantar flexion of ankle   No significant edema   No warmth, erythema or ecchymosis present   Brisk cap refill   Sensation intact  Objective:  BP Readings from Last 1 Encounters:   06/28/21 108/71      Wt Readings from Last 1 Encounters:   06/28/21 56 7 kg (125 lb)        BMI:   Estimated body mass index is 20 18 kg/m² as calculated from the following:    Height as of this encounter: 5' 6" (1 676 m)  Weight as of this encounter: 56 7 kg (125 lb)  Radiographs:  _____________________________________________________  STUDIES REVIEWED:  I have personally reviewed pertinent films and reports in PACS  x-rays the patient's right tibia and fibula show the fractures to be completely healed  There is excellent callus formation  The fractures have healed in good alignment  The hardware is in good position and there are no signs of loosening  There are no dislocations           Vanesa Chaves PA-C

## 2021-07-07 NOTE — PROGRESS NOTES
PT Discharge    Today's date: 2021  Patient name: Susan Cary  : 1965  MRN: [id number]  Referring provider: Eldon Hull DO  Dx:   Encounter Diagnosis     ICD-10-CM    1  Gait abnormality  R26 9    2  Right ankle pain, unspecified chronicity  M25 571    3  Right knee pain, unspecified chronicity  M25 561      Assessment details: Susan Cary was seen in outpatient PT for 3 sessions beginning 21  Patient is a 60 y o  male with diagnosis of right tib fib fracture  Patient has not been seen since  with no f/u sessions scheduled, to be DC at this time  Please see below evaluation for most current objective findings  Impairments: abnormal gait, abnormal muscle firing, abnormal muscle tone, abnormal or restricted ROM, abnormal movement, activity intolerance, impaired balance, impaired physical strength, lacks appropriate home exercise program, pain with function, safety issue and weight-bearing intolerance    Goals - not reassessed due to self DC  STG (6 weeks)  1  Patient will have reported 0/10 pain in right LE at rest    2  Improve patient's right ankle DF to 10 degrees for increased ability to take proper strides during ambulation  3  Increase patient's right single leg balance to 2 seconds for increased stability on stairs  LTG (12 weeks)  1  Patient's LE strength will be equal bilaterally for ability to ambulate and return to functional activities at PLOF  2  Patient will be able to walk in community and return to work with 0/10 pain in right LE  3  Patient will be independent with home exercise program for continued maintenance post PT discharge         Plan  Plan details: DC PT  Plan of Care beginning date: 2021  Treatment plan discussed with: patient and PTA        Subjective Evaluation    History of Present Illness  Date of onset: 2021  Date of surgery: 2/10/2021  Mechanism of injury: Susan Cary is a 60 y o  male who presents to outpatient Physical Therapy today with complaints of right low leg pain  Sandra Job when slipping on ice at the beginning of 2021  States right leg went sideways and had immediate pain  Taken by ambulance to ED Dx with right tib and fib fx  Surgery 1 week later  Had been seen in outpatient PT, but was away for several weeks and was DC  Returning to PT at this time after f/u with DO last week who decreased restrictions to 50% WB  Pain  Current pain ratin  At best pain ratin  At worst pain ratin  Location: anterior low leg, proximal and distally  Quality: sharp  Relieving factors: relaxation  Progression: improved    Social Support  Steps to enter house: yes  1  Stairs in house: yes (bedroom and bathroom 2nd floor)   Lives in: multiple-level home  Lives with: alone    Employment status: not working ([employer]; off work)  Patient Goals  Patient goals for therapy: decreased pain  Patient goal: Increase activity, learn to walk with restricitons        Objective     Observations     Additional Observation Details  2 incisions at ankle, 2 at medial knee healed, scarred  Min-trace edema       Tenderness     Additional Tenderness Details  No significant TTP    Neurological Testing     Sensation     Knee   Left Knee   Intact: light touch    Right Knee   Intact: light touch     Active Range of Motion   Left Knee   Flexion: 140 degrees   Extension: 8 degrees     Right Knee   Flexion: 105 degrees with pain  Extension: 2 degrees with pain  Left Ankle/Foot   Dorsiflexion (ke): 15 degrees   Plantar flexion: 50 degrees   Inversion: 38 degrees   Eversion: 20 degrees     Right Ankle/Foot   Dorsiflexion (ke): -10 degrees   Plantar flexion: 50 degrees   Inversion: 15 degrees   Eversion: 5 degrees     Strength/Myotome Testing     Left Knee   Normal strength    Right Knee   Flexion: 4  Extension: 4  Quadriceps contraction: fair    Left Ankle/Foot   Normal strength    Right Ankle/Foot   Dorsiflexion: 2  Plantar flexion: 3  Inversion: 3  Eversion: 3    General Comments:      Knee Comments  Gait: 2 axillary crutches, NWB, CAM boot    FOTO: 41% function, 63% predicted function

## 2023-03-16 ENCOUNTER — TELEPHONE (OUTPATIENT)
Dept: OBGYN CLINIC | Facility: HOSPITAL | Age: 58
End: 2023-03-16

## 2023-03-16 NOTE — TELEPHONE ENCOUNTER
Caller: patient    Doctor: Donna Cordova    Reason for call: pt will traveling out of town leaving on 3/26 and is asking for an airport card to travel   Pt will      Call back#: [phone]

## (undated) DEVICE — SUT VICRYL 1 CP 27 IN J196H

## (undated) DEVICE — BAG DECANTER

## (undated) DEVICE — GAUZE SPONGES,16 PLY: Brand: CURITY

## (undated) DEVICE — GLOVE SRG BIOGEL 8

## (undated) DEVICE — DRESSING XEROFORM 5 X 9

## (undated) DEVICE — 3M™ COBAN™ NL STERILE NON-LATEX SELF-ADHERENT WRAP, 2084S, 4 IN X 5 YD (10 CM X 4,5 M), 18 ROLLS/CASE: Brand: 3M™ COBAN™

## (undated) DEVICE — SMARTGOWN SURGICAL GOWN, XL, LONG: Brand: CONVERTORS

## (undated) DEVICE — STAPLER SKIN 35 WIDE ULC APPOSE

## (undated) DEVICE — GLOVE INDICATOR PI UNDERGLOVE SZ 7.5 BLUE

## (undated) DEVICE — GLOVE INDICATOR UNDERGLOVE SZ 8 GREEN

## (undated) DEVICE — HEAVY DUTY TABLE COVER: Brand: CONVERTORS

## (undated) DEVICE — INTENDED FOR TISSUE SEPARATION, AND OTHER PROCEDURES THAT REQUIRE A SHARP SURGICAL BLADE TO PUNCTURE OR CUT.: Brand: BARD-PARKER ® CARBON RIB-BACK BLADES

## (undated) DEVICE — ACE WRAP 6 IN XL STERILE

## (undated) DEVICE — PADDING CAST 4 IN  COTTON STRL

## (undated) DEVICE — SKIN MARKER DUAL TIP WITH RULER CAP, FLEXIBLE RULER AND LABELS: Brand: DEVON

## (undated) DEVICE — PREP SURGICAL PURPREP 26ML

## (undated) DEVICE — ACE WRAP 4 IN STERILE

## (undated) DEVICE — BULB SYRINGE,IRRIGATION WITH PROTECTIVE CAP: Brand: DOVER

## (undated) DEVICE — Device

## (undated) DEVICE — GLOVE SRG BIOGEL 7.5

## (undated) DEVICE — SUT ETHILON 3-0 PS-1 18 IN 1663H

## (undated) DEVICE — GLOVE INDICATOR PI UNDERGLOVE SZ 7 BLUE

## (undated) DEVICE — SUT VICRYL 0 CP-1 27 IN J267H

## (undated) DEVICE — FRAZIER SUCTION INSTRUMENT 18 FR W/OBTURATOR, NO CONTROL VENT: Brand: FRAZIER

## (undated) DEVICE — GLOVE SRG BIOGEL 7

## (undated) DEVICE — SPONGE LAP 18 X 18 IN STRL RFD

## (undated) DEVICE — 2.5MM REAMING ROD WITH BALL TIP/950MM-STERILE

## (undated) DEVICE — GLOVE INDICATOR UNDERGLOVE SZ 7.5 GREEN

## (undated) DEVICE — 4-PORT MANIFOLD: Brand: NEPTUNE 2

## (undated) DEVICE — ACE WRAP 6 IN STERILE

## (undated) DEVICE — SUT VICRYL 2-0 CP-1 27 IN J266H

## (undated) DEVICE — BETHLEHEM UNIVERSAL  ARTHRO PK: Brand: CARDINAL HEALTH

## (undated) DEVICE — ABDOMINAL PAD: Brand: DERMACEA

## (undated) DEVICE — COBAN 6 IN STERILE

## (undated) DEVICE — PADDING CAST 6IN COTTON STRL

## (undated) DEVICE — 3M™ STERI-DRAPE™ U-DRAPE 1015: Brand: STERI-DRAPE™

## (undated) DEVICE — DRAPE C-ARM X-RAY